# Patient Record
Sex: FEMALE | Race: AMERICAN INDIAN OR ALASKA NATIVE | URBAN - METROPOLITAN AREA
[De-identification: names, ages, dates, MRNs, and addresses within clinical notes are randomized per-mention and may not be internally consistent; named-entity substitution may affect disease eponyms.]

---

## 2017-07-12 RX ORDER — CEPHALEXIN 500 MG
1 CAPSULE ORAL
Qty: 0 | Refills: 0 | COMMUNITY
Start: 2017-07-12

## 2017-07-13 ENCOUNTER — INPATIENT (INPATIENT)
Facility: HOSPITAL | Age: 27
LOS: 1 days | Discharge: HOME | DRG: 920 | End: 2017-07-15
Attending: HOSPITALIST | Admitting: HOSPITALIST
Payer: MEDICAID

## 2017-07-13 VITALS
SYSTOLIC BLOOD PRESSURE: 116 MMHG | RESPIRATION RATE: 12 BRPM | WEIGHT: 179.9 LBS | HEIGHT: 67 IN | HEART RATE: 84 BPM | DIASTOLIC BLOOD PRESSURE: 78 MMHG | OXYGEN SATURATION: 100 % | TEMPERATURE: 98 F
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
## 2017-07-13 DIAGNOSIS — L76.34 POSTPROCEDURAL SEROMA OF SKIN AND SUBCUTANEOUS TISSUE FOLLOWING OTHER PROCEDURE: ICD-10-CM

## 2017-07-13 LAB
ALBUMIN SERPL ELPH-MCNC: 3.6 G/DL — SIGNIFICANT CHANGE UP (ref 3.3–5)
ALP SERPL-CCNC: 86 U/L — SIGNIFICANT CHANGE UP (ref 40–120)
ALT FLD-CCNC: 30 U/L — SIGNIFICANT CHANGE UP (ref 12–78)
AMYLASE P1 CFR SERPL: 48 U/L — SIGNIFICANT CHANGE UP (ref 25–115)
ANION GAP SERPL CALC-SCNC: 5 MMOL/L — SIGNIFICANT CHANGE UP (ref 5–17)
APPEARANCE UR: CLEAR — SIGNIFICANT CHANGE UP
AST SERPL-CCNC: 15 U/L — SIGNIFICANT CHANGE UP (ref 15–37)
BASOPHILS # BLD AUTO: 0 K/UL — SIGNIFICANT CHANGE UP (ref 0–0.2)
BASOPHILS NFR BLD AUTO: 0.7 % — SIGNIFICANT CHANGE UP (ref 0–2)
BILIRUB SERPL-MCNC: 0.5 MG/DL — SIGNIFICANT CHANGE UP (ref 0.2–1.2)
BILIRUB UR-MCNC: NEGATIVE — SIGNIFICANT CHANGE UP
BUN SERPL-MCNC: 10 MG/DL — SIGNIFICANT CHANGE UP (ref 7–23)
CALCIUM SERPL-MCNC: 8.8 MG/DL — SIGNIFICANT CHANGE UP (ref 8.5–10.1)
CHLORIDE SERPL-SCNC: 107 MMOL/L — SIGNIFICANT CHANGE UP (ref 96–108)
CO2 SERPL-SCNC: 28 MMOL/L — SIGNIFICANT CHANGE UP (ref 22–31)
COLOR SPEC: YELLOW — SIGNIFICANT CHANGE UP
CREAT SERPL-MCNC: 0.68 MG/DL — SIGNIFICANT CHANGE UP (ref 0.5–1.3)
CRP SERPL-MCNC: 0.3 MG/DL — SIGNIFICANT CHANGE UP (ref 0–0.4)
DIFF PNL FLD: NEGATIVE — SIGNIFICANT CHANGE UP
EOSINOPHIL # BLD AUTO: 0.2 K/UL — SIGNIFICANT CHANGE UP (ref 0–0.5)
EOSINOPHIL NFR BLD AUTO: 3.2 % — SIGNIFICANT CHANGE UP (ref 0–6)
ERYTHROCYTE [SEDIMENTATION RATE] IN BLOOD: 17 MM/HR — HIGH (ref 0–15)
GLUCOSE SERPL-MCNC: 83 MG/DL — SIGNIFICANT CHANGE UP (ref 70–99)
GLUCOSE UR QL: NEGATIVE — SIGNIFICANT CHANGE UP
HCG SERPL-ACNC: <1 MIU/ML — SIGNIFICANT CHANGE UP
HCT VFR BLD CALC: 36.3 % — SIGNIFICANT CHANGE UP (ref 34.5–45)
HGB BLD-MCNC: 12.3 G/DL — SIGNIFICANT CHANGE UP (ref 11.5–15.5)
KETONES UR-MCNC: NEGATIVE — SIGNIFICANT CHANGE UP
LACTATE SERPL-SCNC: 0.8 MMOL/L — SIGNIFICANT CHANGE UP (ref 0.7–2)
LEUKOCYTE ESTERASE UR-ACNC: ABNORMAL
LIDOCAIN IGE QN: 141 U/L — SIGNIFICANT CHANGE UP (ref 73–393)
LYMPHOCYTES # BLD AUTO: 2.5 K/UL — SIGNIFICANT CHANGE UP (ref 1–3.3)
LYMPHOCYTES # BLD AUTO: 36.3 % — SIGNIFICANT CHANGE UP (ref 13–44)
MCHC RBC-ENTMCNC: 29.2 PG — SIGNIFICANT CHANGE UP (ref 27–34)
MCHC RBC-ENTMCNC: 33.7 GM/DL — SIGNIFICANT CHANGE UP (ref 32–36)
MCV RBC AUTO: 86.4 FL — SIGNIFICANT CHANGE UP (ref 80–100)
MONOCYTES # BLD AUTO: 0.4 K/UL — SIGNIFICANT CHANGE UP (ref 0–0.9)
MONOCYTES NFR BLD AUTO: 6.4 % — SIGNIFICANT CHANGE UP (ref 1–9)
NEUTROPHILS # BLD AUTO: 3.6 K/UL — SIGNIFICANT CHANGE UP (ref 1.8–7.4)
NEUTROPHILS NFR BLD AUTO: 53.4 % — SIGNIFICANT CHANGE UP (ref 43–77)
NITRITE UR-MCNC: NEGATIVE — SIGNIFICANT CHANGE UP
PH UR: 5 — SIGNIFICANT CHANGE UP (ref 5–8)
PLATELET # BLD AUTO: 280 K/UL — SIGNIFICANT CHANGE UP (ref 150–400)
POTASSIUM SERPL-MCNC: 3.5 MMOL/L — SIGNIFICANT CHANGE UP (ref 3.5–5.3)
POTASSIUM SERPL-SCNC: 3.5 MMOL/L — SIGNIFICANT CHANGE UP (ref 3.5–5.3)
PROCALCITONIN SERPL-MCNC: <0.05 NG/ML — HIGH (ref 0–0.04)
PROT SERPL-MCNC: 7.1 G/DL — SIGNIFICANT CHANGE UP (ref 6–8.3)
PROT UR-MCNC: NEGATIVE — SIGNIFICANT CHANGE UP
RBC # BLD: 4.2 M/UL — SIGNIFICANT CHANGE UP (ref 3.8–5.2)
RBC # FLD: 13.8 % — SIGNIFICANT CHANGE UP (ref 10.3–14.5)
SODIUM SERPL-SCNC: 140 MMOL/L — SIGNIFICANT CHANGE UP (ref 135–145)
SP GR SPEC: 1 — LOW (ref 1.01–1.02)
UROBILINOGEN FLD QL: NEGATIVE — SIGNIFICANT CHANGE UP
WBC # BLD: 6.8 K/UL — SIGNIFICANT CHANGE UP (ref 3.8–10.5)
WBC # FLD AUTO: 6.8 K/UL — SIGNIFICANT CHANGE UP (ref 3.8–10.5)

## 2017-07-13 PROCEDURE — 99223 1ST HOSP IP/OBS HIGH 75: CPT | Mod: AI,GC

## 2017-07-13 PROCEDURE — 99285 EMERGENCY DEPT VISIT HI MDM: CPT

## 2017-07-13 PROCEDURE — 74177 CT ABD & PELVIS W/CONTRAST: CPT | Mod: 26

## 2017-07-13 PROCEDURE — 71010: CPT | Mod: 26

## 2017-07-13 RX ORDER — PIPERACILLIN AND TAZOBACTAM 4; .5 G/20ML; G/20ML
3.38 INJECTION, POWDER, LYOPHILIZED, FOR SOLUTION INTRAVENOUS ONCE
Qty: 0 | Refills: 0 | Status: COMPLETED | OUTPATIENT
Start: 2017-07-13 | End: 2017-07-13

## 2017-07-13 RX ORDER — IOHEXOL 300 MG/ML
30 INJECTION, SOLUTION INTRAVENOUS ONCE
Qty: 0 | Refills: 0 | Status: COMPLETED | OUTPATIENT
Start: 2017-07-13 | End: 2017-07-13

## 2017-07-13 RX ORDER — SODIUM CHLORIDE 9 MG/ML
1000 INJECTION INTRAMUSCULAR; INTRAVENOUS; SUBCUTANEOUS ONCE
Qty: 0 | Refills: 0 | Status: COMPLETED | OUTPATIENT
Start: 2017-07-13 | End: 2017-07-13

## 2017-07-13 RX ORDER — SODIUM CHLORIDE 9 MG/ML
3 INJECTION INTRAMUSCULAR; INTRAVENOUS; SUBCUTANEOUS ONCE
Qty: 0 | Refills: 0 | Status: COMPLETED | OUTPATIENT
Start: 2017-07-13 | End: 2017-07-13

## 2017-07-13 RX ADMIN — SODIUM CHLORIDE 3 MILLILITER(S): 9 INJECTION INTRAMUSCULAR; INTRAVENOUS; SUBCUTANEOUS at 17:59

## 2017-07-13 RX ADMIN — SODIUM CHLORIDE 1000 MILLILITER(S): 9 INJECTION INTRAMUSCULAR; INTRAVENOUS; SUBCUTANEOUS at 18:04

## 2017-07-13 RX ADMIN — IOHEXOL 30 MILLILITER(S): 300 INJECTION, SOLUTION INTRAVENOUS at 18:05

## 2017-07-13 RX ADMIN — PIPERACILLIN AND TAZOBACTAM 200 GRAM(S): 4; .5 INJECTION, POWDER, LYOPHILIZED, FOR SOLUTION INTRAVENOUS at 21:22

## 2017-07-13 NOTE — H&P ADULT - PSH
H/O tubal ligation    History of abdominoplasty    S/P plastic surgery  Chin, bilateral under arms, abdomen

## 2017-07-13 NOTE — H&P ADULT - PROBLEM SELECTOR PLAN 3
-1 stat dose of Heparin 5000 units SQ ordered  -no vte ppx, awaiting surgery evaluation  -Dulcolax for GI ppx

## 2017-07-13 NOTE — H&P ADULT - NSHPREVIEWOFSYSTEMS_GEN_ALL_CORE
Constitutional: admitted to fever, chills, denied diaphoresis   HEENT: admitted to bruising and hardness of skin around chin due to liposuction on chin. Denies blurry vision, difficulty hearing  Respiratory: denies SOB, [place], cough, sputum production, wheezing, hemoptysis  Cardiovascular: denies CP, palpitations, edema  Gastrointestinal: admits to nausea, denies vomiting, diarrhea, constipation, abdominal pain, melena, hematochezia   Genitourinary: denies dysuria, frequency, urgency, hematuria   Skin/Breast: pain, swelling, bleeding and yellow pus oozing from abdominal surgical site. Denies pain or swelling of other surgery sites (chin, b/l under arms, upper and lower back).   Musculoskeletal: denies myalgias, joint swelling, muscle weakness  Neurologic: admits to migraines, denies dizziness, paresthesias, numbness/tingling  Hematology/Oncology: denies bruising, tender or enlarged lymph nodes

## 2017-07-13 NOTE — H&P ADULT - HISTORY OF PRESENT ILLNESS
Pt is a 28 yo female with no significant PMHx  who presents to the ED with a cc of possible surgical infection.  Pt reports that on 6/22/17 she was underwent an abdominoplasty in the DR.  She also reports liposuction of her back, lower chin, and bilateral arms.  Pt traveled home late Monday night, early Tuesday morning. She was able to go to work Tuesday night but she felt discomfort/pain at the surgical site.  Pt previously had no complications at the surgical site.  She then reports that yesterday she felt lightheaded and a rush of heat around the surgical site.   She was at her job and had a physician look at the site.  They felt that it may be infected and started po Keflex.  Last night she reported fever of 102, and today her T max was 100.  She had s physician at her office look at it again and they recommended she come to the ED because it appeared infected.  Pt reports that yesterday she had a HA with associated nausea.  She also reports that she has developed abdominal bloating and chills.  Today when she went to clean the site she noted that some of the skin has begun to separate and that she had some bleeding at the surgical site which she never had before.  Denies N/V/D/C, CP, SOB, ext numbness or weakness.  After the surgery while she was in the ED she completed a 7 day course of Cipro.  Denies vaginal bleeding Pt is a 26 yo female with no significant PMHx who presented with pain, swelling and bleeding of abdominal surgical site s/p abdominoplasty.  Pt reports that on 6/22/17 she was underwent an abdominoplasty in the DR.  She also reports liposuction of her back, lower chin, and bilateral arms.  Pt traveled home late Monday night, early Tuesday morning. She was able to go to work Tuesday night but she felt discomfort/pain at the surgical site.  Pt previously had no complications at the surgical site.  She then reports that yesterday she felt lightheaded and a rush of heat around the surgical site.   She was at her job and had a physician look at the site.  They felt that it may be infected and started po Keflex.  Last night she reported fever of 102, and today her T max was 100.  She had s physician at her office look at it again and they recommended she come to the ED because it appeared infected.  Pt reports that yesterday she had a HA with associated nausea.  She also reports that she has developed abdominal bloating and chills.  Today when she went to clean the site she noted that some of the skin had begun to separate and that she had some bleeding at the surgical site which she never had before.  Patient was seen and examined lying comfortably in bed in NAD with  at bedside. Patient admitted to nausea, migraines, chills but denied V/D/C, CP, SOB, vaginal bleeding, ext numbness or weakness.  After the surgery while she was in the ED she completed a 7 day course of Cipro. She also started Cephalexin 500mg PO yesterday by the physician that told her to come to the ED.     In the ED, the patient's vital signs were stable, T 98.2, HR 84, /78, R 12, SpO2 100% on RA. ESR slightly elevated at 17. Lipase WNL at 141. CBC with dif WNL. CRP WNL 0.30. CMP WNL. Procalcitonin slightly elevated at 0.05. UA showed trace LEs. CT abd & pelvis demonstrated dffuse subcutaneous truncal edema/induration may reflect cellulitis. Correlate clinically for fasciitis.[place] subcutaneous fluid collection mid to lower anterior abdominal wall ay represent seroma or abscess. No intraperitoneal collections. CXR was normal. Patient completed IV Zosyn x1, 1L NS bolus x1 in ED. Blood culture, surgical swab culture, urine cx pending. Plastic surgery was consulted in ED and attempted to drain the patient's abdomen but no fluid was aspirated. Pt is a 28 yo female with no significant PMHx who presented with pain, swelling and bleeding of abdominal surgical site s/p abdominoplasty.  Pt reports that on 6/22/17 she was underwent an abdominoplasty in the DR.  She also reports liposuction of her back, lower chin, and bilateral arms.  Pt traveled home late Monday night, early Tuesday morning. She was able to go to work Tuesday night but she felt discomfort/pain at the surgical site.  Pt previously had no complications at the surgical site.  She then reports that yesterday she felt lightheaded and a rush of heat around the surgical site.   She was at her job and had a physician look at the site.  They felt that it may be infected and started po Keflex.  Last night she reported fever of 102, and today her T max was 100.  She had s physician at her office look at it again and they recommended she come to the ED because it appeared infected.  Pt reports that yesterday she had a HA with associated nausea.  She also reports that she has developed abdominal bloating and chills.  Today when she went to clean the site she noted that some of the skin had begun to separate and that she had some bleeding at the surgical site which she never had before.  Patient was seen and examined lying comfortably in bed in NAD with  at bedside. Patient admitted to nausea, migraines, chills but denied V/D/C, CP, SOB, vaginal bleeding, ext numbness or weakness.  After the surgery while she was in the ED she completed a 7 day course of Cipro. She also started Cephalexin 500mg PO yesterday by the physician that told her to come to the ED.     In the ED, the patient's vital signs were stable, T 98.2, HR 84, /78, R 12, SpO2 100% on RA. ESR slightly elevated at 17. Lipase WNL at 141. CBC with dif WNL. CRP WNL 0.30. CMP WNL. Procalcitonin slightly elevated at 0.05. UA showed trace LEs. CT abd & pelvis demonstrated dffuse subcutaneous truncal edema/induration may reflect cellulitis. Correlate clinically for fasciitis.[place] subcutaneous fluid collection mid to lower anterior abdominal wall ay represent seroma or abscess. No intraperitoneal collections. CXR was normal. Patient completed IV Zosyn x1, 1L NS bolus x1 in ED. Blood culture, surgical swab culture, urine cx pending. Plastic surgery was consulted in ED and attempted to drain the patient's abdomen but no fluid was aspirated.   Plastic surgery Dr. Smith was unclear about the possible abscess, wanted general surgery  to evaluate it.

## 2017-07-13 NOTE — ED ADULT TRIAGE NOTE - CHIEF COMPLAINT QUOTE
patient with complain of incision in infection from "tummy tuck" done 6/22/2017 sent to ED by PMD for evaluation.

## 2017-07-13 NOTE — H&P ADULT - FAMILY HISTORY
Father  Still living? Unknown  Family history of diabetes mellitus, Age at diagnosis: Age Unknown  Family history of hypertension, Age at diagnosis: Age Unknown  Family history of hyperlipidemia, Age at diagnosis: Age Unknown     Mother  Still living? Unknown  Family history of diabetes mellitus, Age at diagnosis: Age Unknown  Family history of hypertension, Age at diagnosis: Age Unknown  Family history of hyperlipidemia, Age at diagnosis: Age Unknown

## 2017-07-13 NOTE — ED PROVIDER NOTE - CARE PLAN
Principal Discharge DX:	Abdominal pain  Instructions for follow-up, activity and diet:	admit  Secondary Diagnosis:	Fluid collection at surgical site, initial encounter

## 2017-07-13 NOTE — H&P ADULT - NSHPPHYSICALEXAM_GEN_ALL_CORE
Vital Signs Last 24 Hrs  T(C): 36.7 (13 Jul 2017 19:15), Max: 36.8 (13 Jul 2017 16:52)  T(F): 98 (13 Jul 2017 19:15), Max: 98.2 (13 Jul 2017 16:52)  HR: 72 (13 Jul 2017 21:45) (72 - 84)  BP: 112/80 (13 Jul 2017 21:45) (112/80 - 118/84)  BP(mean): --  RR: 14 (13 Jul 2017 21:45) (12 - 14)  SpO2: 100% (13 Jul 2017 21:45) (100% - 100%)    Physical Exam:  General: 27 year old female appears stated age lying in stretcher in NAD, well developed, well nourished  HEENT: NCAT, PERRLA, EOMI bl, moist mucous membranes, +chin is tender on deep palpation, and rough to the touch  Neck: Supple, nontender, no mass  Neurology: A&Ox3, nonfocal deficits, sensation intact  Respiratory: CTA B/L, No W/R/R  CV: RRR, +S1/S2, no murmurs, rubs or gallops  Abdominal: Soft, tender to palpation at the center of abdominal surgical site which extends from right to left ASIS, surgical site is currently dry without bleeding or oozing, pinkish in color, mildly distended abdomen, +BSx4  Extremities: No C/C/E, + peripheral pulses  MSK: Normal ROM, no joint erythema or warmth, no joint swelling   Skin: 2 small (~1cm x1cm each) healing circular wounds on upper and lower back, healed under arm scars, no open surgical site on chin, normal skin turgor, normal capillary refill

## 2017-07-13 NOTE — H&P ADULT - ASSESSMENT
Pt is a 28 yo female with no significant PMHx who presented with pain, swelling and bleeding of abdominal surgical site s/p abdominoplasty, admitted for evaluation of surgical site infection.

## 2017-07-13 NOTE — H&P ADULT - ATTENDING COMMENTS
Pt is a 28 yo female with no significant PMHx who presented with pain, swelling and bleeding of abdominal surgical site s/p abdominoplasty, admitted for evaluation of surgical site infection.  Unclear if cellulitis/abscess/fluid collection.  Plastics and Gen surg to evaluate.

## 2017-07-13 NOTE — ED PROVIDER NOTE - OBJECTIVE STATEMENT
Pt is a 28 yo female who presents to the ED with a cc of possible surgical infection.  Pt reports that on 6/22/17 she was underwent an abdominoplasty in the DR.  She also reports liposuction of her back, lower chin, and bilateral arms.  Pt traveled home late Monday night, early Tuesday morning. She was able to go to work Tuesday night but she felt discomfort/pain at the surgical site.  Pt previously had no complications at the surgical site.  She then reports that yesterday she felt lightheaded and a rush of heat around the surgical site.   She was at her job and had a physician look at the site.  They felt that it may be infected and started po Keflex.  Last night she reported fever of 102, and today her T max was 100.  She had s physician at her office look at it again and they recommended she come to the ED because it appeared infected.  Pt reports that yesterday she had a HA with associated nausea.  She also reports that she has developed abdominal bloating and chills.  Today when she went to clean the site she noted that some of the skin has begun to separate and that she had some bleeding at the surgical site which she never had before.  Denies N/V/D/C, CP, SOB, ext numbness or weakness.  After the surgery while she was in the ED she completed a 7 day course of Cipro.  Denies vaginal bleeding

## 2017-07-13 NOTE — ED PROVIDER NOTE - SKIN, MLM
Surgical site noted to lower abdominal region with some distension, surrounding redness and increased warmth.  Parts of the superficial skin is beginning to separate with serosanguinous discharge and intermittent bloody discharge from site

## 2017-07-13 NOTE — H&P ADULT - PROBLEM SELECTOR PLAN 1
-Abdominal pain, swelling 2/2 possible surgical site infection s/p abdominoplasty. CT abd&pelvis showed diffuse subcutaneous truncal edema/induration may reflect cellulitis. [place] subcutaneous fluid collection mid to lower anterior abdominal wall   may represent seroma or abscess. No intraperitoneal collections.  -admit to F  -NPO for now  -Continue IV Zosyn q8hrs over 4 hours   -IVF NS @ 75 cc/hr, stop after 6 hours   -Zofran prn for nausea   -Surgery consult, f/u recs Dr. Catalan   -Plastic surgery consult, f/u recs Dr. Mehta  -f/u wound cx, blood cx, urine cx   -fall precautions  -out of bed with assistance

## 2017-07-13 NOTE — H&P ADULT - NSHPSOCIALHISTORY_GEN_ALL_CORE
Social History:    Marital Status:    Occupation: works at doctor's office  Lives with:  and 3 kids  Ambulates at home: independently without assistive devices     Substance Use: denies  Tobacco Usage: denies  Alcohol Usage: denies   Illicit Drug Usage: denies

## 2017-07-14 ENCOUNTER — TRANSCRIPTION ENCOUNTER (OUTPATIENT)
Age: 27
End: 2017-07-14

## 2017-07-14 DIAGNOSIS — R10.9 UNSPECIFIED ABDOMINAL PAIN: ICD-10-CM

## 2017-07-14 DIAGNOSIS — Z98.51 TUBAL LIGATION STATUS: Chronic | ICD-10-CM

## 2017-07-14 DIAGNOSIS — Z98.890 OTHER SPECIFIED POSTPROCEDURAL STATES: Chronic | ICD-10-CM

## 2017-07-14 DIAGNOSIS — R10.84 GENERALIZED ABDOMINAL PAIN: ICD-10-CM

## 2017-07-14 DIAGNOSIS — Z98.890 OTHER SPECIFIED POSTPROCEDURAL STATES: ICD-10-CM

## 2017-07-14 DIAGNOSIS — Z29.9 ENCOUNTER FOR PROPHYLACTIC MEASURES, UNSPECIFIED: ICD-10-CM

## 2017-07-14 LAB
ANION GAP SERPL CALC-SCNC: 5 MMOL/L — SIGNIFICANT CHANGE UP (ref 5–17)
BUN SERPL-MCNC: 6 MG/DL — LOW (ref 7–23)
CALCIUM SERPL-MCNC: 8.3 MG/DL — LOW (ref 8.5–10.1)
CHLORIDE SERPL-SCNC: 110 MMOL/L — HIGH (ref 96–108)
CO2 SERPL-SCNC: 27 MMOL/L — SIGNIFICANT CHANGE UP (ref 22–31)
CREAT SERPL-MCNC: 0.72 MG/DL — SIGNIFICANT CHANGE UP (ref 0.5–1.3)
CULTURE RESULTS: NO GROWTH — SIGNIFICANT CHANGE UP
GLUCOSE SERPL-MCNC: 87 MG/DL — SIGNIFICANT CHANGE UP (ref 70–99)
HCT VFR BLD CALC: 34.7 % — SIGNIFICANT CHANGE UP (ref 34.5–45)
HGB BLD-MCNC: 11.5 G/DL — SIGNIFICANT CHANGE UP (ref 11.5–15.5)
MCHC RBC-ENTMCNC: 28.4 PG — SIGNIFICANT CHANGE UP (ref 27–34)
MCHC RBC-ENTMCNC: 33.2 GM/DL — SIGNIFICANT CHANGE UP (ref 32–36)
MCV RBC AUTO: 85.6 FL — SIGNIFICANT CHANGE UP (ref 80–100)
PLATELET # BLD AUTO: 242 K/UL — SIGNIFICANT CHANGE UP (ref 150–400)
POTASSIUM SERPL-MCNC: 4.1 MMOL/L — SIGNIFICANT CHANGE UP (ref 3.5–5.3)
POTASSIUM SERPL-SCNC: 4.1 MMOL/L — SIGNIFICANT CHANGE UP (ref 3.5–5.3)
RBC # BLD: 4.05 M/UL — SIGNIFICANT CHANGE UP (ref 3.8–5.2)
RBC # FLD: 13.6 % — SIGNIFICANT CHANGE UP (ref 10.3–14.5)
SODIUM SERPL-SCNC: 142 MMOL/L — SIGNIFICANT CHANGE UP (ref 135–145)
SPECIMEN SOURCE: SIGNIFICANT CHANGE UP
WBC # BLD: 4.8 K/UL — SIGNIFICANT CHANGE UP (ref 3.8–10.5)
WBC # FLD AUTO: 4.8 K/UL — SIGNIFICANT CHANGE UP (ref 3.8–10.5)

## 2017-07-14 PROCEDURE — 49406 IMAGE CATH FLUID PERI/RETRO: CPT

## 2017-07-14 PROCEDURE — 99233 SBSQ HOSP IP/OBS HIGH 50: CPT | Mod: GC

## 2017-07-14 RX ORDER — SODIUM CHLORIDE 9 MG/ML
1000 INJECTION INTRAMUSCULAR; INTRAVENOUS; SUBCUTANEOUS
Qty: 0 | Refills: 0 | Status: DISCONTINUED | OUTPATIENT
Start: 2017-07-14 | End: 2017-07-15

## 2017-07-14 RX ORDER — PIPERACILLIN AND TAZOBACTAM 4; .5 G/20ML; G/20ML
3.38 INJECTION, POWDER, LYOPHILIZED, FOR SOLUTION INTRAVENOUS EVERY 8 HOURS
Qty: 0 | Refills: 0 | Status: DISCONTINUED | OUTPATIENT
Start: 2017-07-14 | End: 2017-07-15

## 2017-07-14 RX ORDER — HEPARIN SODIUM 5000 [USP'U]/ML
5000 INJECTION INTRAVENOUS; SUBCUTANEOUS ONCE
Qty: 0 | Refills: 0 | Status: COMPLETED | OUTPATIENT
Start: 2017-07-14 | End: 2017-07-14

## 2017-07-14 RX ORDER — ENOXAPARIN SODIUM 100 MG/ML
40 INJECTION SUBCUTANEOUS EVERY 24 HOURS
Qty: 0 | Refills: 0 | Status: DISCONTINUED | OUTPATIENT
Start: 2017-07-14 | End: 2017-07-14

## 2017-07-14 RX ORDER — ONDANSETRON 8 MG/1
4 TABLET, FILM COATED ORAL EVERY 6 HOURS
Qty: 0 | Refills: 0 | Status: DISCONTINUED | OUTPATIENT
Start: 2017-07-14 | End: 2017-07-15

## 2017-07-14 RX ADMIN — SODIUM CHLORIDE 75 MILLILITER(S): 9 INJECTION INTRAMUSCULAR; INTRAVENOUS; SUBCUTANEOUS at 05:31

## 2017-07-14 RX ADMIN — SODIUM CHLORIDE 75 MILLILITER(S): 9 INJECTION INTRAMUSCULAR; INTRAVENOUS; SUBCUTANEOUS at 02:55

## 2017-07-14 RX ADMIN — PIPERACILLIN AND TAZOBACTAM 25 GRAM(S): 4; .5 INJECTION, POWDER, LYOPHILIZED, FOR SOLUTION INTRAVENOUS at 21:21

## 2017-07-14 RX ADMIN — PIPERACILLIN AND TAZOBACTAM 25 GRAM(S): 4; .5 INJECTION, POWDER, LYOPHILIZED, FOR SOLUTION INTRAVENOUS at 05:27

## 2017-07-14 RX ADMIN — HEPARIN SODIUM 5000 UNIT(S): 5000 INJECTION INTRAVENOUS; SUBCUTANEOUS at 05:27

## 2017-07-14 RX ADMIN — PIPERACILLIN AND TAZOBACTAM 25 GRAM(S): 4; .5 INJECTION, POWDER, LYOPHILIZED, FOR SOLUTION INTRAVENOUS at 13:21

## 2017-07-14 NOTE — DISCHARGE NOTE ADULT - CARE PLAN
Principal Discharge DX:	Fluid collection at surgical site, initial encounter  Goal:	Drainage of site and wound healing  Instructions for follow-up, activity and diet:	Keep drain in place, keep surgical site clean and dry. Follow-up with Dr. Asher.  Secondary Diagnosis:	History of abdominoplasty  Instructions for follow-up, activity and diet:	Keep surgical sites clean and dry. Follow-up with Dr. Asher Principal Discharge DX:	Fluid collection at surgical site, initial encounter  Goal:	Drainage of site and wound healing  Instructions for follow-up, activity and diet:	Keep drain in place, keep surgical site clean and dry. Follow-up with Dr. Mehta.  Secondary Diagnosis:	History of abdominoplasty  Instructions for follow-up, activity and diet:	Keep surgical sites clean and dry. Follow-up with Dr. Mehta

## 2017-07-14 NOTE — DISCHARGE NOTE ADULT - PATIENT PORTAL LINK FT
“You can access the [facility] Patient Portal, offered by [facility], by registering with the following website: [email or web address]”

## 2017-07-14 NOTE — PROGRESS NOTE ADULT - SUBJECTIVE AND OBJECTIVE BOX
Resident Progress Note    Patient is a 27y old  Female who presents with a chief complaint of c/o possible surgical site infection (2017 23:31)      HPI: Patient seen and examined at bedside. No acute events overnight. Reports that she is frustrated with her care  since she can't eat or drink anything at this time. Denies any SOB, abdominal pain, N/V, f/c, CP, dizziness, HA.      Vital Signs Last 24 Hrs  T(C): 36.7 (17 @ 13:14), Max: 36.8 (17 @ 16:52)  T(F): 98 (17 @ 13:14), Max: 98.2 (17 @ 16:52)  HR: 75 (17 @ 13:14) (68 - 84)  BP: 112/78 (17 @ 13:14) (106/60 - 118/84)  BP(mean): --  RR: 16 (17 @ 13:14) (12 - 17)  SpO2: 99% (17 @ 13:14) (99% - 100%)    Physical Exam:  General: Well developed, well nourished, NAD, young female  HEENT: NCAT, PERRLA, EOMI bl, moist mucous membranes   Neck: Supple, nontender, no mass  Neurology: A&Ox3, nonfocal, CN II-XII grossly intact  Respiratory: CTA B/L, No W/R/R  CV: RRR, +S1/S2, no murmurs, rubs or gallops  Abdominal: Soft,ND +BSx4, + mid to lower anterior abdominal wall surgical site is TTP, pink, no discharge or bleeding noted.  Extremities: No C/C/E, + peripheral pulses   Skin: warm, dry, normal color, no rash or abnormal lesions    LABS:                        11.5   4.8   )-----------( 242      ( 2017 08:56 )             34.7     2017 08:56    142    |  110    |  6      ----------------------------<  87     4.1     |  27     |  0.72     Ca    8.3        2017 08:56    TPro  7.1    /  Alb  3.6    /  TBili  0.5    /  DBili  x      /  AST  15     /  ALT  30     /  AlkPhos  86     2017 17:55          Urinalysis Basic - ( 2017 21:21 )    Color: Yellow / Appearance: Clear / S.005 / pH: x  Gluc: x / Ketone: Negative  / Bili: Negative / Urobili: Negative   Blood: x / Protein: Negative / Nitrite: Negative   Leuk Esterase: Trace / RBC: 0-2 /HPF / WBC 3-5   Sq Epi: x / Non Sq Epi: Moderate / Bacteria: Few      CAPILLARY BLOOD GLUCOSE            RADIOLOGY & ADDITIONAL TESTS:    MEDICATIONS  (STANDING):  sodium chloride 0.9%. 1000 milliLiter(s) (75 mL/Hr) IV Continuous <Continuous>  piperacillin/tazobactam IVPB. 3.375 Gram(s) IV Intermittent every 8 hours    MEDICATIONS  (PRN):  ondansetron Injectable 4 milliGRAM(s) IV Push every 6 hours PRN Nausea  bisacodyl 5 milliGRAM(s) Oral daily PRN Constipation      Allergies    No Known Allergies    Intolerances

## 2017-07-14 NOTE — DISCHARGE NOTE ADULT - MEDICATION SUMMARY - MEDICATIONS TO TAKE
I will START or STAY ON the medications listed below when I get home from the hospital:    Augmentin 875 mg-125 mg oral tablet  -- 1 tab(s) by mouth every 12 hours  -- Indication: For Fluid collection at surgical site, initial encounter

## 2017-07-14 NOTE — PATIENT PROFILE ADULT. - VISION (WITH CORRECTIVE LENSES IF THE PATIENT USUALLY WEARS THEM):
Severely impaired: cannot locate objects without hearing or touching them or patient nonresponsive./glasses

## 2017-07-14 NOTE — DISCHARGE NOTE ADULT - MEDICATION SUMMARY - MEDICATIONS TO STOP TAKING
I will STOP taking the medications listed below when I get home from the hospital:    cephalexin 500 mg oral tablet  -- 1 tab(s) by mouth 4 times a day

## 2017-07-14 NOTE — PROGRESS NOTE ADULT - PROBLEM SELECTOR PLAN 2
-Abdominal pain, swelling 2/2 possible surgical site infection s/p abdominoplasty. CT abd&pelvis showed diffuse subcutaneous truncal edema/induration may reflect cellulitis. [place] subcutaneous fluid collection mid to lower anterior abdominal wall   may represent seroma or abscess. No intraperitoneal collections.  -admit to F  -NPO for now  -Continue IV Zosyn q8hrs over 4 hours   -IVF NS @ 75 cc/hr, stop after 6 hours   -Zofran prn for nausea   -Surgery consult, f/u recs Dr. Catalan   -Plastic surgery consult, f/u recs Dr. Mehta  -f/u wound cx, blood cx, urine cx   -fall precautions  -out of bed with assistance -Abdominal pain, swelling 2/2 possible surgical site infection s/p abdominoplasty. CT abd&pelvis showed diffuse subcutaneous truncal edema/induration may reflect cellulitis. [place] subcutaneous fluid collection mid to lower anterior abdominal wall   may represent seroma or abscess. No intraperitoneal collections.  -Dr. Malhotra following  -Patient is low risk patient for a low to intermediate procedure.  -Dr. Ha is taking patient for an abdominal abscess drainage today    -NPO for now  -Continue IV Zosyn q8hrs over 4 hours   -Zofran prn for nausea   -Surgery consult, f/u recs Dr. Catalan   -Plastic surgery consult, f/u recs Dr. Mehta  -f/u wound cx, blood cx, urine cx   -fall precautions  -out of bed with assistance

## 2017-07-14 NOTE — PROGRESS NOTE ADULT - PROBLEM SELECTOR PLAN 1
-Plastic Surgery (Dr. Smith) following  -Dr. Catalan (Surgery) was notified and did not know he was consulted on patient even though admission note stated that Dr. Catalan was consulted. Have tried to contact Dr Smith however her office said she is on her way to [place].  -Continue Zosyn  -NPO for now  -Zofran prn for nausea   -f/u wound cx, blood cx, urine cx   -fall precautions  -out of bed with assistance

## 2017-07-14 NOTE — CHART NOTE - NSCHARTNOTEFT_GEN_A_CORE
Patient s/p IR drainage of abdominal abscess, 100cc drained.  Discussed with Dr. Asher, may advance patient's diet and if stable may discharge home with drain in place and follow-up outpatient.

## 2017-07-14 NOTE — DISCHARGE NOTE ADULT - HOSPITAL COURSE
27F with no significant PMHx who presented with pain, swelling and bleeding of abdominal surgical site s/p abdominoplasty.  Pt reported that on 6/22/17 she was underwent an abdominoplasty in the DR.  She also reported liposuction of her back, lower chin, and bilateral arms.  Pt traveled home late 7/10, early the next morning. She was able to go to work the night of 7/11 but she felt discomfort/pain at the surgical site.  Pt previously had no complications at the surgical site.  She then reported that day prior to admission she felt lightheaded and a rush of heat around the surgical site.   She was at her job and had a physician look at the site.  They felt that it may be infected and started po Keflex.  Night before admission she reported fever of 102, and day of admission her T max was 100.  She had s physician at her office look at it again and they recommended she come to the ED because it appeared infected.  Pt reported day prior to admission had a HA with associated nausea.  She also reported abdominal bloating and chills.  Day of admission she went to clean the site and noted that some of the skin had begun to separate and that she had some bleeding at the surgical site which she never had before.  Patient was seen and examined lying comfortably in bed in NAD with  at bedside. Patient admitted to nausea, migraines, chills but denied V/D/C, CP, SOB, vaginal bleeding, ext numbness or weakness.  After the surgery while she was in the ED she completed a 7 day course of Cipro. She also started Cephalexin 500mg PO yesterday by the physician that told her to come to the ED.     In the ED, the patient's vital signs were stable, T 98.2, HR 84, /78, R 12, SpO2 100% on RA. ESR slightly elevated at 17. Lipase WNL at 141. CBC with dif WNL. CRP WNL 0.30. CMP WNL. Procalcitonin slightly elevated at 0.05. UA showed trace LEs. CT abd & pelvis demonstrated dffuse subcutaneous truncal edema/induration may reflect cellulitis. Correlate clinically for fasciitis.[place] subcutaneous fluid collection mid to lower anterior abdominal wall ay represent seroma or abscess. No intraperitoneal collections. CXR was normal. Patient completed IV Zosyn x1, 1L NS bolus x1 in ED. Blood culture, surgical swab culture, urine cx pending. Plastic surgery was consulted in ED and attempted to drain the patient's abdomen but no fluid was aspirated.   Plastic surgery Dr. Smith was unclear about the possible abscess, wanted general surgery  to evaluate it.     Patient admitted for possible abdominal abscess. Patient placed on IV antibiotics. Dr. Smith (plastic surgery) consulted interventional radiology (Dr Ha) who drained 100cc of fluid and placed a drain. Advanced patient's diet, hemodynamically stable, without leukocytosis, afebrile, stable for discharge home with outpatient follow-up.

## 2017-07-14 NOTE — BRIEF OPERATIVE NOTE - POST-OP DX
Seroma of skin or subcutaneous tissue after non-dermatologic procedure  07/14/2017    Active  Zack Ha

## 2017-07-14 NOTE — DISCHARGE NOTE ADULT - PLAN OF CARE
Drainage of site and wound healing Keep drain in place, keep surgical site clean and dry. Follow-up with Dr. Asher. Keep surgical sites clean and dry. Follow-up with Dr. Asher Keep drain in place, keep surgical site clean and dry. Follow-up with Dr. Mehta. Keep surgical sites clean and dry. Follow-up with Dr. Mehta

## 2017-07-15 VITALS
HEART RATE: 80 BPM | RESPIRATION RATE: 16 BRPM | SYSTOLIC BLOOD PRESSURE: 102 MMHG | DIASTOLIC BLOOD PRESSURE: 69 MMHG | OXYGEN SATURATION: 99 % | TEMPERATURE: 99 F

## 2017-07-15 LAB
ANION GAP SERPL CALC-SCNC: 8 MMOL/L — SIGNIFICANT CHANGE UP (ref 5–17)
BUN SERPL-MCNC: 8 MG/DL — SIGNIFICANT CHANGE UP (ref 7–23)
CALCIUM SERPL-MCNC: 8.7 MG/DL — SIGNIFICANT CHANGE UP (ref 8.5–10.1)
CHLORIDE SERPL-SCNC: 108 MMOL/L — SIGNIFICANT CHANGE UP (ref 96–108)
CO2 SERPL-SCNC: 26 MMOL/L — SIGNIFICANT CHANGE UP (ref 22–31)
CREAT SERPL-MCNC: 0.62 MG/DL — SIGNIFICANT CHANGE UP (ref 0.5–1.3)
GLUCOSE SERPL-MCNC: 82 MG/DL — SIGNIFICANT CHANGE UP (ref 70–99)
GRAM STN FLD: SIGNIFICANT CHANGE UP
HCT VFR BLD CALC: 35.9 % — SIGNIFICANT CHANGE UP (ref 34.5–45)
HGB BLD-MCNC: 12.1 G/DL — SIGNIFICANT CHANGE UP (ref 11.5–15.5)
MCHC RBC-ENTMCNC: 28.6 PG — SIGNIFICANT CHANGE UP (ref 27–34)
MCHC RBC-ENTMCNC: 33.6 GM/DL — SIGNIFICANT CHANGE UP (ref 32–36)
MCV RBC AUTO: 85.2 FL — SIGNIFICANT CHANGE UP (ref 80–100)
PLATELET # BLD AUTO: 223 K/UL — SIGNIFICANT CHANGE UP (ref 150–400)
POTASSIUM SERPL-MCNC: 3.8 MMOL/L — SIGNIFICANT CHANGE UP (ref 3.5–5.3)
POTASSIUM SERPL-SCNC: 3.8 MMOL/L — SIGNIFICANT CHANGE UP (ref 3.5–5.3)
RBC # BLD: 4.21 M/UL — SIGNIFICANT CHANGE UP (ref 3.8–5.2)
RBC # FLD: 13.6 % — SIGNIFICANT CHANGE UP (ref 10.3–14.5)
SODIUM SERPL-SCNC: 142 MMOL/L — SIGNIFICANT CHANGE UP (ref 135–145)
SPECIMEN SOURCE: SIGNIFICANT CHANGE UP
WBC # BLD: 4.8 K/UL — SIGNIFICANT CHANGE UP (ref 3.8–10.5)
WBC # FLD AUTO: 4.8 K/UL — SIGNIFICANT CHANGE UP (ref 3.8–10.5)

## 2017-07-15 PROCEDURE — 99238 HOSP IP/OBS DSCHRG MGMT 30/<: CPT

## 2017-07-15 RX ADMIN — PIPERACILLIN AND TAZOBACTAM 25 GRAM(S): 4; .5 INJECTION, POWDER, LYOPHILIZED, FOR SOLUTION INTRAVENOUS at 05:06

## 2017-07-15 NOTE — PROGRESS NOTE ADULT - SUBJECTIVE AND OBJECTIVE BOX
CHIEF COMPLAINT/INTERVAL HISTORY:  Pt. seen and evaluated for possible surgical site infection.  Pt. is feeling well today.  Reports no pain today.  Tolerating IV antibiotic.    REVIEW OF SYSTEMS:  No fever, CP, or SOB.    Vital Signs Last 24 Hrs  T(C): 36.7 (15 Jul 2017 05:11), Max: 36.9 (2017 20:12)  T(F): 98 (15 Jul 2017 05:11), Max: 98.5 (2017 20:12)  HR: 79 (15 Jul 2017 05:11) (69 - 79)  BP: 98/66 (15 Jul 2017 05:11) (94/68 - 112/78)  BP(mean): --  RR: 17 (15 Jul 2017 05:11) (16 - 17)  SpO2: 98% (15 Jul 2017 05:11) (98% - 99%)    PHYSICAL EXAM:  GENERAL: NAD  HEENT: EOMI, hearing normal, conjunctiva and sclera clear  Chest: CTA bilaterally, no wheezing  CV: S1S2, RRR,   GI: soft, +BS, NT/ND, +drain in RLQ, abdominoplasty incision healing.  Musculoskeletal: no edema  Psychiatric: affect nL, mood nL  Skin: warm and dry    LABS:                        12.1   4.8   )-----------( 223      ( 15 Jul 2017 07:23 )             35.9     07-15    142  |  108  |  8   ----------------------------<  82  3.8   |  26  |  0.62    Ca    8.7      15 Jul 2017 07:23    TPro  7.1  /  Alb  3.6  /  TBili  0.5  /  DBili  x   /  AST  15  /  ALT  30  /  AlkPhos  86  07-13      Urinalysis Basic - ( 2017 21:21 )    Color: Yellow / Appearance: Clear / S.005 / pH: x  Gluc: x / Ketone: Negative  / Bili: Negative / Urobili: Negative   Blood: x / Protein: Negative / Nitrite: Negative   Leuk Esterase: Trace / RBC: 0-2 /HPF / WBC 3-5   Sq Epi: x / Non Sq Epi: Moderate / Bacteria: Few        Assessment and Plan:  -Surgical site infection/s/p abdominoplasty: s/p IR drainage.  All cultures so far negative.  Maintain drain.  Continue antibiotic therapy.  Pt. to follow up with Plastic surgery as outpatient.

## 2017-07-18 ENCOUNTER — INPATIENT (INPATIENT)
Facility: HOSPITAL | Age: 27
LOS: 1 days | Discharge: HOME | DRG: 863 | End: 2017-07-20
Attending: INTERNAL MEDICINE | Admitting: HOSPITALIST
Payer: MEDICAID

## 2017-07-18 VITALS
RESPIRATION RATE: 19 BRPM | TEMPERATURE: 98 F | OXYGEN SATURATION: 97 % | HEART RATE: 85 BPM | SYSTOLIC BLOOD PRESSURE: 111 MMHG | WEIGHT: 180.78 LBS | DIASTOLIC BLOOD PRESSURE: 77 MMHG | HEIGHT: 67 IN

## 2017-07-18 DIAGNOSIS — Z98.890 OTHER SPECIFIED POSTPROCEDURAL STATES: Chronic | ICD-10-CM

## 2017-07-18 DIAGNOSIS — T14.8 OTHER INJURY OF UNSPECIFIED BODY REGION: ICD-10-CM

## 2017-07-18 DIAGNOSIS — Z98.51 TUBAL LIGATION STATUS: Chronic | ICD-10-CM

## 2017-07-18 DIAGNOSIS — L02.211 CUTANEOUS ABSCESS OF ABDOMINAL WALL: ICD-10-CM

## 2017-07-18 LAB
ALBUMIN SERPL ELPH-MCNC: 3.5 G/DL — SIGNIFICANT CHANGE UP (ref 3.3–5)
ALP SERPL-CCNC: 79 U/L — SIGNIFICANT CHANGE UP (ref 40–120)
ALT FLD-CCNC: 31 U/L — SIGNIFICANT CHANGE UP (ref 12–78)
ANION GAP SERPL CALC-SCNC: 7 MMOL/L — SIGNIFICANT CHANGE UP (ref 5–17)
APTT BLD: 32.2 SEC — SIGNIFICANT CHANGE UP (ref 27.5–37.4)
AST SERPL-CCNC: 12 U/L — LOW (ref 15–37)
BASOPHILS # BLD AUTO: 0.1 K/UL — SIGNIFICANT CHANGE UP (ref 0–0.2)
BASOPHILS NFR BLD AUTO: 0.8 % — SIGNIFICANT CHANGE UP (ref 0–2)
BILIRUB SERPL-MCNC: 0.4 MG/DL — SIGNIFICANT CHANGE UP (ref 0.2–1.2)
BUN SERPL-MCNC: 15 MG/DL — SIGNIFICANT CHANGE UP (ref 7–23)
CALCIUM SERPL-MCNC: 8.7 MG/DL — SIGNIFICANT CHANGE UP (ref 8.5–10.1)
CHLORIDE SERPL-SCNC: 108 MMOL/L — SIGNIFICANT CHANGE UP (ref 96–108)
CO2 SERPL-SCNC: 27 MMOL/L — SIGNIFICANT CHANGE UP (ref 22–31)
CREAT SERPL-MCNC: 0.71 MG/DL — SIGNIFICANT CHANGE UP (ref 0.5–1.3)
EOSINOPHIL # BLD AUTO: 0.3 K/UL — SIGNIFICANT CHANGE UP (ref 0–0.5)
EOSINOPHIL NFR BLD AUTO: 4.1 % — SIGNIFICANT CHANGE UP (ref 0–6)
GLUCOSE SERPL-MCNC: 80 MG/DL — SIGNIFICANT CHANGE UP (ref 70–99)
HCT VFR BLD CALC: 36.4 % — SIGNIFICANT CHANGE UP (ref 34.5–45)
HGB BLD-MCNC: 12.4 G/DL — SIGNIFICANT CHANGE UP (ref 11.5–15.5)
INR BLD: 1.19 RATIO — HIGH (ref 0.88–1.16)
LACTATE SERPL-SCNC: 0.8 MMOL/L — SIGNIFICANT CHANGE UP (ref 0.7–2)
LYMPHOCYTES # BLD AUTO: 2.2 K/UL — SIGNIFICANT CHANGE UP (ref 1–3.3)
LYMPHOCYTES # BLD AUTO: 34.2 % — SIGNIFICANT CHANGE UP (ref 13–44)
MCHC RBC-ENTMCNC: 29.1 PG — SIGNIFICANT CHANGE UP (ref 27–34)
MCHC RBC-ENTMCNC: 34 GM/DL — SIGNIFICANT CHANGE UP (ref 32–36)
MCV RBC AUTO: 85.4 FL — SIGNIFICANT CHANGE UP (ref 80–100)
MONOCYTES # BLD AUTO: 0.5 K/UL — SIGNIFICANT CHANGE UP (ref 0–0.9)
MONOCYTES NFR BLD AUTO: 7.1 % — SIGNIFICANT CHANGE UP (ref 1–9)
NEUTROPHILS # BLD AUTO: 3.5 K/UL — SIGNIFICANT CHANGE UP (ref 1.8–7.4)
NEUTROPHILS NFR BLD AUTO: 53.8 % — SIGNIFICANT CHANGE UP (ref 43–77)
PLATELET # BLD AUTO: 237 K/UL — SIGNIFICANT CHANGE UP (ref 150–400)
POTASSIUM SERPL-MCNC: 3.6 MMOL/L — SIGNIFICANT CHANGE UP (ref 3.5–5.3)
POTASSIUM SERPL-SCNC: 3.6 MMOL/L — SIGNIFICANT CHANGE UP (ref 3.5–5.3)
PROT SERPL-MCNC: 6.7 G/DL — SIGNIFICANT CHANGE UP (ref 6–8.3)
PROTHROM AB SERPL-ACNC: 13 SEC — HIGH (ref 9.8–12.7)
RBC # BLD: 4.27 M/UL — SIGNIFICANT CHANGE UP (ref 3.8–5.2)
RBC # FLD: 13.6 % — SIGNIFICANT CHANGE UP (ref 10.3–14.5)
SODIUM SERPL-SCNC: 142 MMOL/L — SIGNIFICANT CHANGE UP (ref 135–145)
WBC # BLD: 6.6 K/UL — SIGNIFICANT CHANGE UP (ref 3.8–10.5)
WBC # FLD AUTO: 6.6 K/UL — SIGNIFICANT CHANGE UP (ref 3.8–10.5)

## 2017-07-18 PROCEDURE — 93010 ELECTROCARDIOGRAM REPORT: CPT

## 2017-07-18 PROCEDURE — 99285 EMERGENCY DEPT VISIT HI MDM: CPT

## 2017-07-18 RX ORDER — PIPERACILLIN AND TAZOBACTAM 4; .5 G/20ML; G/20ML
3.38 INJECTION, POWDER, LYOPHILIZED, FOR SOLUTION INTRAVENOUS ONCE
Qty: 0 | Refills: 0 | Status: COMPLETED | OUTPATIENT
Start: 2017-07-18 | End: 2017-07-18

## 2017-07-18 RX ADMIN — PIPERACILLIN AND TAZOBACTAM 200 GRAM(S): 4; .5 INJECTION, POWDER, LYOPHILIZED, FOR SOLUTION INTRAVENOUS at 21:41

## 2017-07-18 NOTE — ED PROVIDER NOTE - OBJECTIVE STATEMENT
28 yo female s/p abdominoplasty in  on 6/22 presents to ED today for c/o of multidrug resistant E.coli grown from a wound culture of her lower abdomen from 7/12 at her doctors office.  Patient has been on augmentin but is resistant to it.  No fever/chills.  Sent in by PMD Dr. Anne Dixon for IV antibiotics

## 2017-07-18 NOTE — ED ADULT NURSE NOTE - OBJECTIVE STATEMENT
Patient came in to ED for IV antibiotics from wound culture on her lower abdomen on July 12. Patient states had tummy tuck surgery done on June 22. On assessment noted with tube drain on her right groin area with brownish fluid output. Patient denies fever at home. Patient came in to ED for IV antibiotics from resistant E coli from wound culture on her lower abdomen done on July 12. Patient states had tummy tuck surgery done on June 22. On assessment noted with tube drain on her right groin area with brownish fluid output. Patient denies fever at home.

## 2017-07-19 DIAGNOSIS — Z29.9 ENCOUNTER FOR PROPHYLACTIC MEASURES, UNSPECIFIED: ICD-10-CM

## 2017-07-19 DIAGNOSIS — T14.8 OTHER INJURY OF UNSPECIFIED BODY REGION: ICD-10-CM

## 2017-07-19 LAB
ANION GAP SERPL CALC-SCNC: 8 MMOL/L — SIGNIFICANT CHANGE UP (ref 5–17)
BUN SERPL-MCNC: 17 MG/DL — SIGNIFICANT CHANGE UP (ref 7–23)
CALCIUM SERPL-MCNC: 8.6 MG/DL — SIGNIFICANT CHANGE UP (ref 8.5–10.1)
CHLORIDE SERPL-SCNC: 108 MMOL/L — SIGNIFICANT CHANGE UP (ref 96–108)
CO2 SERPL-SCNC: 25 MMOL/L — SIGNIFICANT CHANGE UP (ref 22–31)
CREAT SERPL-MCNC: 0.68 MG/DL — SIGNIFICANT CHANGE UP (ref 0.5–1.3)
CULTURE RESULTS: SIGNIFICANT CHANGE UP
GLUCOSE SERPL-MCNC: 93 MG/DL — SIGNIFICANT CHANGE UP (ref 70–99)
HCT VFR BLD CALC: 37.1 % — SIGNIFICANT CHANGE UP (ref 34.5–45)
HGB BLD-MCNC: 12.3 G/DL — SIGNIFICANT CHANGE UP (ref 11.5–15.5)
MCHC RBC-ENTMCNC: 28.7 PG — SIGNIFICANT CHANGE UP (ref 27–34)
MCHC RBC-ENTMCNC: 33.2 GM/DL — SIGNIFICANT CHANGE UP (ref 32–36)
MCV RBC AUTO: 86.5 FL — SIGNIFICANT CHANGE UP (ref 80–100)
PLATELET # BLD AUTO: 212 K/UL — SIGNIFICANT CHANGE UP (ref 150–400)
POTASSIUM SERPL-MCNC: 3.4 MMOL/L — LOW (ref 3.5–5.3)
POTASSIUM SERPL-SCNC: 3.4 MMOL/L — LOW (ref 3.5–5.3)
RBC # BLD: 4.29 M/UL — SIGNIFICANT CHANGE UP (ref 3.8–5.2)
RBC # FLD: 13.7 % — SIGNIFICANT CHANGE UP (ref 10.3–14.5)
SODIUM SERPL-SCNC: 141 MMOL/L — SIGNIFICANT CHANGE UP (ref 135–145)
SPECIMEN SOURCE: SIGNIFICANT CHANGE UP
WBC # BLD: 6.3 K/UL — SIGNIFICANT CHANGE UP (ref 3.8–10.5)
WBC # FLD AUTO: 6.3 K/UL — SIGNIFICANT CHANGE UP (ref 3.8–10.5)

## 2017-07-19 PROCEDURE — 99223 1ST HOSP IP/OBS HIGH 75: CPT | Mod: AI,GC

## 2017-07-19 RX ORDER — ENOXAPARIN SODIUM 100 MG/ML
40 INJECTION SUBCUTANEOUS DAILY
Qty: 0 | Refills: 0 | Status: DISCONTINUED | OUTPATIENT
Start: 2017-07-19 | End: 2017-07-20

## 2017-07-19 RX ORDER — ERTAPENEM SODIUM 1 G/1
1000 INJECTION, POWDER, LYOPHILIZED, FOR SOLUTION INTRAMUSCULAR; INTRAVENOUS ONCE
Qty: 0 | Refills: 0 | Status: COMPLETED | OUTPATIENT
Start: 2017-07-19 | End: 2017-07-19

## 2017-07-19 RX ORDER — ERTAPENEM SODIUM 1 G/1
1000 INJECTION, POWDER, LYOPHILIZED, FOR SOLUTION INTRAMUSCULAR; INTRAVENOUS EVERY 24 HOURS
Qty: 0 | Refills: 0 | Status: DISCONTINUED | OUTPATIENT
Start: 2017-07-20 | End: 2017-07-20

## 2017-07-19 RX ORDER — ERTAPENEM SODIUM 1 G/1
1 INJECTION, POWDER, LYOPHILIZED, FOR SOLUTION INTRAMUSCULAR; INTRAVENOUS
Qty: 14 | Refills: 0 | OUTPATIENT
Start: 2017-07-19 | End: 2017-08-02

## 2017-07-19 RX ORDER — PIPERACILLIN AND TAZOBACTAM 4; .5 G/20ML; G/20ML
3.38 INJECTION, POWDER, LYOPHILIZED, FOR SOLUTION INTRAVENOUS EVERY 8 HOURS
Qty: 0 | Refills: 0 | Status: DISCONTINUED | OUTPATIENT
Start: 2017-07-19 | End: 2017-07-19

## 2017-07-19 RX ORDER — ERTAPENEM SODIUM 1 G/1
INJECTION, POWDER, LYOPHILIZED, FOR SOLUTION INTRAMUSCULAR; INTRAVENOUS
Qty: 0 | Refills: 0 | Status: DISCONTINUED | OUTPATIENT
Start: 2017-07-19 | End: 2017-07-20

## 2017-07-19 RX ADMIN — PIPERACILLIN AND TAZOBACTAM 25 GRAM(S): 4; .5 INJECTION, POWDER, LYOPHILIZED, FOR SOLUTION INTRAVENOUS at 04:46

## 2017-07-19 RX ADMIN — ERTAPENEM SODIUM 120 MILLIGRAM(S): 1 INJECTION, POWDER, LYOPHILIZED, FOR SOLUTION INTRAMUSCULAR; INTRAVENOUS at 12:20

## 2017-07-19 RX ADMIN — ENOXAPARIN SODIUM 40 MILLIGRAM(S): 100 INJECTION SUBCUTANEOUS at 12:21

## 2017-07-19 NOTE — H&P ADULT - NSHPLABSRESULTS_GEN_ALL_CORE
Wound culture from outpatient office reviewed    2 bacteria acenetobacter Baumanii and e. Coli with multi drug resistance    Sensitivities reviewed

## 2017-07-19 NOTE — PROGRESS NOTE ADULT - ASSESSMENT
28 yo female with PMHx of recent surgical site infection s/p abdominoplasty/liposuction in the [place], s/p discharge on 7/15/17 from [facility] for treatment, who presented to ED by her PMD for positive blood cultures,E Coli ESBL as per ID.

## 2017-07-19 NOTE — H&P ADULT - ATTENDING COMMENTS
patient seen personally by attending MD. Cultures reviewed. Wound cultures taken before prior hospitalization at  shows Acinetobacter sensitive to augmentin and e. coli resistant to augmentin. The e. coli is sensitive to zosyn, and per chart, she received one dose on 7/14/17. Plan to F/U plastic surgery and Infectious disease recommendations re: antibiotic regimen and drain removal.

## 2017-07-19 NOTE — H&P ADULT - NSHPPHYSICALEXAM_GEN_ALL_CORE
Vital Signs Last 24 Hrs  T(C): 36.7 (07-18-17 @ 22:45), Max: 36.9 (07-18-17 @ 19:48)  T(F): 98.1 (07-18-17 @ 22:45), Max: 98.5 (07-18-17 @ 19:48)  HR: 72 (07-18-17 @ 22:45) (72 - 85)  BP: 106/72 (07-18-17 @ 22:45) (106/72 - 111/77)  BP(mean): --  RR: 15 (07-18-17 @ 22:45) (15 - 19)  SpO2: 99% (07-18-17 @ 22:45) (97% - 99%)    Physical Exam:  General: Well developed, well nourished, NAD  HEENT: NCAT, PERRLA, EOMI bl, moist mucous membranes  Neck: Supple, nontender, no mass  Neurology: A&Ox3, nonfocal deficits, sensation intact  Respiratory: CTA B/L, No W/R/R  CV: RRR, +S1/S2, no murmurs, rubs or gallops  Abdominal: Soft, nontender nondistended, + BS in RLQ. + Lower abdominal surgical site which extends from right to left ASIS, surgical site is currently dry without bleeding or oozing, pinkish in color  Extremities: No C/C/E, + peripheral pulses  MSK: Normal ROM, no joint erythema or warmth, no joint swelling   Skin: 2 small (~1cm x1cm each) healing circular wounds on upper and lower back, healed under arm scars, no open surgical site on chin, normal skin turgor, normal capillary refill Vital Signs Last 24 Hrs  T(C): 36.7 (07-18-17 @ 22:45), Max: 36.9 (07-18-17 @ 19:48)  T(F): 98.1 (07-18-17 @ 22:45), Max: 98.5 (07-18-17 @ 19:48)  HR: 72 (07-18-17 @ 22:45) (72 - 85)  BP: 106/72 (07-18-17 @ 22:45) (106/72 - 111/77)  BP(mean): --  RR: 15 (07-18-17 @ 22:45) (15 - 19)  SpO2: 99% (07-18-17 @ 22:45) (97% - 99%)    Physical Exam:  General: Well developed, well nourished, NAD  HEENT: NCAT, PERRLA, EOMI bl, moist mucous membranes  Neck: Supple, nontender, no mass  Neurology: A&Ox3, nonfocal deficits, sensation intact  Respiratory: CTA B/L, No W/R/R  CV: RRR, +S1/S2, no murmurs, rubs or gallops  Abdominal: Soft, nontender nondistended, + BS in RLQ. + Lower abdominal surgical site which extends from right to left ASIS, surgical site is currently dry without bleeding or oozing, pinkish in color. +drain in RLQ draining serous fluid.  Extremities: No C/C/E, + peripheral pulses  MSK: Normal ROM, no joint erythema or warmth, no joint swelling   Skin: 2 small (~1cm x1cm each) healing circular wounds on upper and lower back, healed under arm scars, no open surgical site on chin, normal skin turgor, normal capillary refill

## 2017-07-19 NOTE — H&P ADULT - NSHPREVIEWOFSYSTEMS_GEN_ALL_CORE

## 2017-07-19 NOTE — H&P ADULT - ASSESSMENT
Patient is a 28 yo female with PMHx of recent surgical site infection s/p abdominoplasty/liposuction in the [place], s/p discharge on 7/15/17 from [facility] for treatment, who presented to ED by her PMD for positive blood cultures, admit for positive blood cultures from wound infection.

## 2017-07-19 NOTE — H&P ADULT - PROBLEM SELECTOR PLAN 1
+ outpatient blood cultures from 7/12/17. Showed Ecoli. with resistance to Augmentin  s/p 1 dose IV Zosyn in ED  Continue ______  Follow up AM CBC, BMP, Blood cultures  Infections Disease consult Dr. Hummel + outpatient blood cultures from 7/12/17. Showed Ecoli. with resistance to Augmentin  Admit to [facility]  s/p 1 dose IV Zosyn in ED  Continue ______  Follow up AM CBC, BMP, Blood cultures  Infections Disease consult Dr. Hummel + outpatient blood cultures from 7/12/17. Showed Ecoli and Acinetobacter. with resistance to Augmentin, sensitive to Zosyn  Admit to [facility]  s/p 1 dose IV Zosyn in ED  Continue Zosyn  Follow up AM CBC, BMP, Blood cultures  Infections Disease consult Dr. Hummel + outpatient wound  cultures from 7/12/17. Showed Ecoli and Acinetobacter. with resistance to Augmentin, sensitive to Zosyn  Admit to [facility]  s/p 1 dose IV Zosyn in ED  Continue Zosyn  Follow up AM CBC, BMP, Blood cultures  Infections Disease consult Dr. Hummel  Has appt with pastic surg [place], will consult + outpatient wound  cultures from 7/12/17. Showed Ecoli and Acinetobacter. with resistance to Augmentin, sensitive to Zosyn  Admit to [facility]  s/p 1 dose IV Zosyn in ED  Continue Zosyn  Follow up AM CBC, BMP, Blood cultures  Infections Disease consult Dr. Hummel  Has appt with pastic surg [facility] thursday for drain removal. will contact office for reccomendations.

## 2017-07-19 NOTE — H&P ADULT - HISTORY OF PRESENT ILLNESS
Patient is a 26 yo female with PMHx of recent surgical site infection s/p abdominoplasty/liposuction in the [place], s/p discharge on 7/15/17 from [facility] for treatment, who presented to ED by her PMD for positive blood cultures. Patient reports that on 6/22/17 she was underwent an abdominoplasty and liposuction in the . She reports the liposuction was of her back, lower chin, and bilateral arms. She failed outpatient abx therapy by her PMD. Patient reports that today she was informed that her outpatient blood cultures prior to last admission (drawn on 7/12), were positive for resistant E coli, as per documentation patient supplied from her PMD. Patient was admitted 7/13/17, was followed by plastic surgery and general surgery, she was treated with IV antibiotics and later discharged home on Augmentin 7/15/17. Blood cultures, urine culture, surgical site culture during hospital stay were all negative. Today she denies any fever, chills, headache, dizziness, chest pain, palpitations, SOB, cough, abdominal pain, nausea, vomiting, diarrhea. She denies any pain, swelling, or bleeding of abdominal surgical site. She reports she feels fine and is here due to being told by her PMD to return to ED due to the cultures showing resistance to Augmentin, which is what the patient was discharged home on.      In the ED, the patient's vital signs were stable, pt had CBC, CMP, lactate drawn. Lactate normal. Blood cultures were drawn. EKG showed: NSR, PVCs, VR 81 - unofficial read. Patient received 1 dose IV Zosyn in ED. Patient is a 26 yo female with PMHx of recent surgical site infection s/p abdominoplasty/liposuction in the [place], s/p discharge on 7/15/17 from [facility] for treatment, who presented to ED by her PMD for positive blood cultures. Patient reports that on 6/22/17 she was underwent an abdominoplasty and liposuction in the . She reports the liposuction was of her back, lower chin, and bilateral arms. She failed outpatient abx therapy by her PMD. Patient reports that today she was informed that her outpatient blood cultures prior to last admission (drawn on 7/12), were positive for resistant E coli, as per documentation patient supplied from her PMD. Patient was admitted 7/13/17, was followed by plastic surgery and general surgery, had IR drainage, was treated with IV antibiotics and later discharged home on Augmentin 7/15/17 with outpatient plastic surgery follow up. Blood cultures, urine culture, surgical site culture during hospital stay were all negative. Today she denies any fever, chills, headache, dizziness, chest pain, palpitations, SOB, cough, abdominal pain, nausea, vomiting, diarrhea. She denies any pain, swelling, or bleeding of abdominal surgical site. She reports she feels fine and is here due to being told by her PMD to return to ED due to the cultures showing resistance to Augmentin, which is what the patient was discharged home on.      In the ED, the patient's vital signs were stable, pt had CBC, CMP, lactate drawn. Lactate normal. Blood cultures were drawn. EKG showed: NSR, PVCs, VR 81 - unofficial read. Patient received 1 dose IV Zosyn in ED.

## 2017-07-19 NOTE — CONSULT NOTE ADULT - SUBJECTIVE AND OBJECTIVE BOX
HPI:  Patient is a 28 yo female with PMHx of recent surgical site infection s/p abdominoplasty/liposuction in the [place], s/p discharge on 7/15/17 from [facility] for treatment, who presented to ED by her PMD for positive blood cultures. Patient reports that on 6/22/17 she was underwent an abdominoplasty and liposuction in the . She reports the liposuction was of her back, lower chin, and bilateral arms. She failed outpatient abx therapy by her PMD. Patient reports that today she was informed that her outpatient blood cultures prior to last admission (drawn on 7/12), were positive for resistant E coli, as per documentation patient supplied from her PMD. Patient was admitted 7/13/17, was followed by plastic surgery and general surgery, had IR drainage, was treated with IV antibiotics and later discharged home on Augmentin 7/15/17 with outpatient plastic surgery follow up. Blood cultures, urine culture, surgical site culture during hospital stay were all negative. Today she denies any fever, chills, headache, dizziness, chest pain, palpitations, SOB, cough, abdominal pain, nausea, vomiting, diarrhea. She denies any pain, swelling, or bleeding of abdominal surgical site. She reports she feels fine and is here due to being told by her PMD to return to ED due to the cultures showing resistance to Augmentin, which is what the patient was discharged home on.      In the ED, the patient's vital signs were stable, pt had CBC, CMP, lactate drawn. Lactate normal. Blood cultures were drawn. EKG showed: NSR, PVCs, VR 81 - unofficial read. Patient received 1 dose IV Zosyn in ED. (19 Jul 2017 01:17)    Patient now reporting decreased but continued drainage from rt lower abd surgical site. No fevers, not feeling poorly.      PAST MEDICAL & SURGICAL HISTORY:  No pertinent past medical history  H/O tubal ligation  S/P plastic surgery: Chin, bilateral under arms, abdomen  History of abdominoplasty      Antimicrobials  ertapenem  IVPB   IV Intermittent       Immunological      Other  enoxaparin Injectable 40 milliGRAM(s) SubCutaneous daily      Allergies    No Known Allergies    Intolerances        SOCIAL HISTORY:  no current tobacco use reported. 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    FAMILY HISTORY:  Family history of hyperlipidemia  Family history of hypertension  Family history of diabetes mellitus      ROS:    EYES:  Negative  blurry vision or double vision  GASTROINTESTINAL:  Negative for nausea, vomiting, diarrhea  -otherwise negative except for subjective    Vital Signs Last 24 Hrs  T(C): 36.7 (19 Jul 2017 03:46), Max: 36.9 (18 Jul 2017 19:48)  T(F): 98.1 (19 Jul 2017 03:46), Max: 98.5 (18 Jul 2017 19:48)  HR: 75 (19 Jul 2017 03:46) (72 - 86)  BP: 103/71 (19 Jul 2017 03:46) (96/65 - 111/77)  BP(mean): --  RR: 16 (19 Jul 2017 03:46) (14 - 19)  SpO2: 100% (19 Jul 2017 03:46) (97% - 100%)    PE:  WDWN in no distress  HEENT:  NC, PERRL, sclerae anicteric, conjunctivae clear, EOMI.  Sinuses nontender, no nasal exudate.  No buccal or pharyngeal lesions, erythema or exudate  Neck:  Supple, no adenopathy  Lungs:  No accessory muscle use, bilaterally clear to auscultation  Cor:  RRR, S1, S2, no murmur appreciated  Abd:  Symmetric, normoactive BS.  Soft, nontender, no masses, guarding or rebound.  Liver and spleen not enlarged  Extrem:  No cyanosis or edema  Skin:  surgical sites look clean except for RLQ abd drain with no surrounding erythema or inflammation. drainage serosanguinous  Neuro: grossly intact  Musc: moving all limbs freely, no focal deficits    LABS:                        12.3   6.3   )-----------( 212      ( 19 Jul 2017 06:26 )             37.1     07-19    141  |  108  |  17  ----------------------------<  93  3.4<L>   |  25  |  0.68    Ca    8.6      19 Jul 2017 06:26    TPro  6.7  /  Alb  3.5  /  TBili  0.4  /  DBili  x   /  AST  12<L>  /  ALT  31  /  AlkPhos  79  07-18        MICROBIOLOGY:  Wound Aerobic:  Acinetobacter-  Augmentin-S  Cefepime-S  Meropenem-S  E Coli  Ertapenem-S  -resistant to multiple abx    Culture - Blood (07.14.17 @ 00:11)    Specimen Source: .Blood Blood-Venous    Culture Results:   No growth at 5 days.        RADIOLOGY & ADDITIONAL STUDIES:    --

## 2017-07-19 NOTE — ED ADULT NURSE REASSESSMENT NOTE - NS ED NURSE REASSESS COMMENT FT1
I called up Dr. Reyes if he wants to put patient on isolation contact precaution and he said no need. ANNE Foster made aware. No isolation precaution.

## 2017-07-19 NOTE — PROGRESS NOTE ADULT - SUBJECTIVE AND OBJECTIVE BOX
INTERVAL HPI/OVERNIGHT EVENTS: 26 yo F with hx of liposuction s/p infection followed by plastic surgery.  Patient seen and examined at bedside. Pending PICC line. ID Dr. Rome consulted. Needs IV antibiotics given persistent infection.    MEDICATIONS  (STANDING):  enoxaparin Injectable 40 milliGRAM(s) SubCutaneous daily  ertapenem  IVPB   IV Intermittent     MEDICATIONS  (PRN):      Allergies    No Known Allergies    Intolerances        CONSTITUTIONAL: No weakness, fevers or chills  RESPIRATORY: No cough, wheezing, hemoptysis; No shortness of breath  Cvs: No chest pain or palpitations  Gi: No abdominal pain. No nausea, vomiting, diarrhea or constipation. No melena or hematochezia.  Neuro: no weakness    All other review of systems is negative unless indicated above.    Vital Signs Last 24 Hrs  T(C): 36.9 (19 Jul 2017 14:02), Max: 36.9 (18 Jul 2017 19:48)  T(F): 98.5 (19 Jul 2017 14:02), Max: 98.5 (18 Jul 2017 19:48)  HR: 76 (19 Jul 2017 14:02) (72 - 86)  BP: 94/67 (19 Jul 2017 14:02) (94/67 - 111/77)  BP(mean): --  RR: 16 (19 Jul 2017 14:02) (14 - 19)  SpO2: 100% (19 Jul 2017 14:02) (97% - 100%)    General: NAD  Neurology: A&Ox3 , nonfocal  Respiratory: CTA B/L  CV: RRR, S1S2  Abdominal: Soft, mild tenderness, ND +BS, Drains in place  Extremities: No edema, + peripheral pulses      LABS:                        12.3   6.3   )-----------( 212      ( 19 Jul 2017 06:26 )             37.1     07-19    141  |  108  |  17  ----------------------------<  93  3.4<L>   |  25  |  0.68    Ca    8.6      19 Jul 2017 06:26    TPro  6.7  /  Alb  3.5  /  TBili  0.4  /  DBili  x   /  AST  12<L>  /  ALT  31  /  AlkPhos  79  07-18    PT/INR - ( 18 Jul 2017 21:38 )   PT: 13.0 sec;   INR: 1.19 ratio         PTT - ( 18 Jul 2017 21:38 )  PTT:32.2 sec    INTERVAL HPI/OVERNIGHT EVENTS: Patient seen and examined at bedside. No acute events overnight.     MEDICATIONS  (STANDING):  enoxaparin Injectable 40 milliGRAM(s) SubCutaneous daily  ertapenem  IVPB   IV Intermittent     MEDICATIONS  (PRN):      Allergies    No Known Allergies    Intolerances        CONSTITUTIONAL: No weakness, fevers or chills  RESPIRATORY: No cough, wheezing, hemoptysis; No shortness of breath  Cvs: No chest pain or palpitations  Gi: No abdominal pain. No nausea, vomiting, diarrhea or constipation. No melena or hematochezia.  Neuro: no weakness    All other review of systems is negative unless indicated above.    Vital Signs Last 24 Hrs  T(C): 36.9 (19 Jul 2017 14:02), Max: 36.9 (18 Jul 2017 19:48)  T(F): 98.5 (19 Jul 2017 14:02), Max: 98.5 (18 Jul 2017 19:48)  HR: 76 (19 Jul 2017 14:02) (72 - 86)  BP: 94/67 (19 Jul 2017 14:02) (94/67 - 111/77)  BP(mean): --  RR: 16 (19 Jul 2017 14:02) (14 - 19)  SpO2: 100% (19 Jul 2017 14:02) (97% - 100%)    General: NAD  Neurology: A&Ox , nonfocal  Respiratory: CTA B/L  CV: RRR, S1S2  Abdominal: Soft, NT, ND +BS  Extremities: No edema, + peripheral pulses      LABS:                        12.3   6.3   )-----------( 212      ( 19 Jul 2017 06:26 )             37.1     07-19    141  |  108  |  17  ----------------------------<  93  3.4<L>   |  25  |  0.68    Ca    8.6      19 Jul 2017 06:26    TPro  6.7  /  Alb  3.5  /  TBili  0.4  /  DBili  x   /  AST  12<L>  /  ALT  31  /  AlkPhos  79  07-18    PT/INR - ( 18 Jul 2017 21:38 )   PT: 13.0 sec;   INR: 1.19 ratio         PTT - ( 18 Jul 2017 21:38 )  PTT:32.2 sec      RADIOLOGY & ADDITIONAL TESTS:        RADIOLOGY & ADDITIONAL TESTS:

## 2017-07-19 NOTE — CONSULT NOTE ADULT - PROBLEM SELECTOR RECOMMENDATION 9
with sensitivities no good oral options, recommend placing PICC and putting patient on course of IV abx.  Will base duration on response, decrease in drainage and ability to remove drain and drop in inflammatory markers. Minimum duration 2 weeks but may extend past this based on course.  -Place PICC ordered  -Ertapenem 1 gram IV Q-day minimum 2 weeks  -weekly CBC, BMP, ESR    Thank you for consulting us and involving us in the management of this most interesting and challenging case.     We will follow along in the care of this patient.

## 2017-07-20 ENCOUNTER — TRANSCRIPTION ENCOUNTER (OUTPATIENT)
Age: 27
End: 2017-07-20

## 2017-07-20 VITALS
TEMPERATURE: 99 F | SYSTOLIC BLOOD PRESSURE: 104 MMHG | RESPIRATION RATE: 18 BRPM | HEART RATE: 73 BPM | DIASTOLIC BLOOD PRESSURE: 72 MMHG | OXYGEN SATURATION: 99 %

## 2017-07-20 LAB
ANION GAP SERPL CALC-SCNC: 6 MMOL/L — SIGNIFICANT CHANGE UP (ref 5–17)
BUN SERPL-MCNC: 10 MG/DL — SIGNIFICANT CHANGE UP (ref 7–23)
CALCIUM SERPL-MCNC: 8.7 MG/DL — SIGNIFICANT CHANGE UP (ref 8.5–10.1)
CHLORIDE SERPL-SCNC: 108 MMOL/L — SIGNIFICANT CHANGE UP (ref 96–108)
CO2 SERPL-SCNC: 28 MMOL/L — SIGNIFICANT CHANGE UP (ref 22–31)
CREAT SERPL-MCNC: 0.61 MG/DL — SIGNIFICANT CHANGE UP (ref 0.5–1.3)
GLUCOSE SERPL-MCNC: 91 MG/DL — SIGNIFICANT CHANGE UP (ref 70–99)
POTASSIUM SERPL-MCNC: 4 MMOL/L — SIGNIFICANT CHANGE UP (ref 3.5–5.3)
POTASSIUM SERPL-SCNC: 4 MMOL/L — SIGNIFICANT CHANGE UP (ref 3.5–5.3)
SODIUM SERPL-SCNC: 142 MMOL/L — SIGNIFICANT CHANGE UP (ref 135–145)

## 2017-07-20 PROCEDURE — 99285 EMERGENCY DEPT VISIT HI MDM: CPT | Mod: 25

## 2017-07-20 PROCEDURE — 87040 BLOOD CULTURE FOR BACTERIA: CPT

## 2017-07-20 PROCEDURE — 36569 INSJ PICC 5 YR+ W/O IMAGING: CPT

## 2017-07-20 PROCEDURE — 83605 ASSAY OF LACTIC ACID: CPT

## 2017-07-20 PROCEDURE — 77001 FLUOROGUIDE FOR VEIN DEVICE: CPT | Mod: 26

## 2017-07-20 PROCEDURE — 85610 PROTHROMBIN TIME: CPT

## 2017-07-20 PROCEDURE — C1894: CPT

## 2017-07-20 PROCEDURE — 80048 BASIC METABOLIC PNL TOTAL CA: CPT

## 2017-07-20 PROCEDURE — 76937 US GUIDE VASCULAR ACCESS: CPT

## 2017-07-20 PROCEDURE — 76937 US GUIDE VASCULAR ACCESS: CPT | Mod: 26

## 2017-07-20 PROCEDURE — 99239 HOSP IP/OBS DSCHRG MGMT >30: CPT

## 2017-07-20 PROCEDURE — C1769: CPT

## 2017-07-20 PROCEDURE — C1751: CPT

## 2017-07-20 PROCEDURE — 96376 TX/PRO/DX INJ SAME DRUG ADON: CPT

## 2017-07-20 PROCEDURE — 96372 THER/PROPH/DIAG INJ SC/IM: CPT | Mod: 59

## 2017-07-20 PROCEDURE — 85027 COMPLETE CBC AUTOMATED: CPT

## 2017-07-20 PROCEDURE — 77001 FLUOROGUIDE FOR VEIN DEVICE: CPT

## 2017-07-20 PROCEDURE — 93005 ELECTROCARDIOGRAM TRACING: CPT

## 2017-07-20 PROCEDURE — 96375 TX/PRO/DX INJ NEW DRUG ADDON: CPT

## 2017-07-20 PROCEDURE — 80053 COMPREHEN METABOLIC PANEL: CPT

## 2017-07-20 PROCEDURE — 96365 THER/PROPH/DIAG IV INF INIT: CPT

## 2017-07-20 PROCEDURE — 85652 RBC SED RATE AUTOMATED: CPT

## 2017-07-20 PROCEDURE — 86140 C-REACTIVE PROTEIN: CPT

## 2017-07-20 PROCEDURE — 85730 THROMBOPLASTIN TIME PARTIAL: CPT

## 2017-07-20 RX ORDER — MULTIVIT-MIN/FERROUS GLUCONATE 9 MG/15 ML
1 LIQUID (ML) ORAL
Qty: 0 | Refills: 0 | COMMUNITY
Start: 2017-07-20

## 2017-07-20 RX ORDER — ASCORBIC ACID 60 MG
1 TABLET,CHEWABLE ORAL
Qty: 0 | Refills: 0 | COMMUNITY
Start: 2017-07-20

## 2017-07-20 RX ORDER — ASCORBIC ACID 60 MG
500 TABLET,CHEWABLE ORAL
Qty: 0 | Refills: 0 | Status: CANCELLED | OUTPATIENT
Start: 2017-07-20 | End: 2017-07-20

## 2017-07-20 RX ORDER — MULTIVIT-MIN/FERROUS GLUCONATE 9 MG/15 ML
1 LIQUID (ML) ORAL DAILY
Qty: 0 | Refills: 0 | Status: CANCELLED | OUTPATIENT
Start: 2017-07-20 | End: 2017-07-20

## 2017-07-20 RX ADMIN — ERTAPENEM SODIUM 120 MILLIGRAM(S): 1 INJECTION, POWDER, LYOPHILIZED, FOR SOLUTION INTRAMUSCULAR; INTRAVENOUS at 11:20

## 2017-07-20 RX ADMIN — ENOXAPARIN SODIUM 40 MILLIGRAM(S): 100 INJECTION SUBCUTANEOUS at 11:19

## 2017-07-20 NOTE — DISCHARGE NOTE ADULT - PATIENT PORTAL LINK FT
“You can access the [facility] Patient Portal, offered by [facility], by registering with the following website: [email or web address]”

## 2017-07-20 NOTE — PROGRESS NOTE ADULT - ASSESSMENT
26 yo female with recent liposuction tummy tuck now with polymicrobial infection of surgical site with drain in place

## 2017-07-20 NOTE — PROGRESS NOTE ADULT - PROBLEM SELECTOR PLAN 1
+ outpatient wound  cultures from 7/12/17. Showed ESBL Ecoli and Acinetobacter. with resistance to Augmentin, sensitive to Zosyn  Admit to [facility]  s/p 1 dose IV Zosyn in ED  Continue Zosyn  Follow up AM CBC, BMP, Blood cultures  Infections Disease consult Dr. Rome  PICC line to be placed  IV antibiotics for 2-4 weeks as per ID  [facility]ursday for [facility] removal
+ outpatient wound  cultures from 7/12/17. Showed ESBL Ecoli and Acinetobacter. with resistance to Augmentin, sensitive to Zosyn  Continue IV ertapenem  Discharge with PICC and IV abx
Minimum duration 2 weeks but may extend past this based on course.  -Place PICC ordered  -Ertapenem 1 gram IV Q-day minimum 2 weeks  -weekly CBC, BMP, ESR  -patient will call to see me in office in 2 weeks.    Thank you for consulting us and involving us in the management of this most interesting and challenging case.     Please Call with any further questions

## 2017-07-20 NOTE — DISCHARGE NOTE ADULT - MEDICATION SUMMARY - MEDICATIONS TO TAKE
I will START or STAY ON the medications listed below when I get home from the hospital:    ertapenem 1 g injection  -- 1 gram(s) injectable once a day  -- Indication: For Wound infection    Multiple Vitamins with Minerals oral tablet  -- 1 tab(s) by mouth once a day  -- Indication: For Need for prophylactic measure    ascorbic acid 500 mg oral tablet  -- 1 tab(s) by mouth 2 times a day  -- Indication: For Need for prophylactic measure

## 2017-07-20 NOTE — DISCHARGE NOTE ADULT - CARE PLAN
Principal Discharge DX:	Wound infection  Goal:	stable  Instructions for follow-up, activity and diet:	Continue IV ertapenem as per ID  Follow up with Dr. Rome within 1-2 weeks as scheduled  Secondary Diagnosis:	History of abdominoplasty  Instructions for follow-up, activity and diet:	stable

## 2017-07-20 NOTE — PROGRESS NOTE ADULT - SUBJECTIVE AND OBJECTIVE BOX
infectious diseases progress note:    DIANNA BURDEN is a 27y y. o. Female patient    Patient reports: some nausea with IV abx yesterday but was on empty stomch    ROS:    EYES:  Negative  blurry vision or double vision  GASTROINTESTINAL:  Negative for, vomiting, diarrhea  -otherwise negative except for subjective    Allergies    No Known Allergies    Intolerances        ANTIBIOTICS/RELEVANT:  antimicrobials  ertapenem  IVPB   IV Intermittent   ertapenem  IVPB 1000 milliGRAM(s) IV Intermittent every 24 hours    immunologic:    OTHER:  enoxaparin Injectable 40 milliGRAM(s) SubCutaneous daily      Objective:  Vital Signs Last 24 Hrs  T(C): 36.7 (20 Jul 2017 04:25), Max: 36.9 (19 Jul 2017 14:02)  T(F): 98 (20 Jul 2017 04:25), Max: 98.5 (19 Jul 2017 14:02)  HR: 73 (20 Jul 2017 04:25) (66 - 76)  BP: 100/68 (20 Jul 2017 04:25) (94/67 - 101/69)  BP(mean): --  RR: 17 (20 Jul 2017 04:25) (16 - 17)  SpO2: 100% (20 Jul 2017 04:25) (99% - 100%)    T(C): 36.7 (20 Jul 2017 04:25), Max: 36.9 (18 Jul 2017 19:48)  T(C): 36.7 (20 Jul 2017 04:25), Max: 36.9 (18 Jul 2017 19:48)  T(C): 36.7 (20 Jul 2017 04:25), Max: 36.9 (18 Jul 2017 19:48)    PHYSICAL EXAM:  Constitutional:Well-developed, well nourished  Eyes:PERRLA, EOMI  Ear/Nose/Throat: oropharynx normal	  Neck:no JVD, no lymphadenopathy, supple  Respiratory: no accessory muscle use  Cardiovascular:RRR,   Gastrointestinal:soft, NT  Extremities:no clubbing, no cyanosis, edema absent  Skin: drain rt LQ with decreased amount of clear yellow fluid draining      LABS:                        12.3   6.3   )-----------( 212      ( 19 Jul 2017 06:26 )             37.1       6.3 07-19 @ 06:26  6.6 07-18 @ 21:38  4.8 07-15 @ 07:23  4.8 07-14 @ 08:56  6.8 07-13 @ 17:55      07-19    141  |  108  |  17  ----------------------------<  93  3.4<L>   |  25  |  0.68    Ca    8.6      19 Jul 2017 06:26    TPro  6.7  /  Alb  3.5  /  TBili  0.4  /  DBili  x   /  AST  12<L>  /  ALT  31  /  AlkPhos  79  07-18    PT/INR - ( 18 Jul 2017 21:38 )   PT: 13.0 sec;   INR: 1.19 ratio         PTT - ( 18 Jul 2017 21:38 )  PTT:32.2 sec        MICROBIOLOGY:          RADIOLOGY & ADDITIONAL STUDIES:

## 2017-07-20 NOTE — PROGRESS NOTE ADULT - ASSESSMENT
28 yo female with PMHx of recent surgical site infection s/p abdominoplasty/liposuction in the [place], s/p discharge on 7/15/17 from [facility] for treatment, who presented to ED by her PMD for positive blood cultures,E Coli ESBL as per ID.

## 2017-07-20 NOTE — DISCHARGE NOTE ADULT - NS MD DC FALL RISK RISK
For information on Fall & Injury Prevention, visit [email or web address]
[email or web address]

## 2017-07-20 NOTE — DISCHARGE NOTE ADULT - CARE PROVIDER_API CALL
Catrachito Rome (MD; PhD), Infectious Disease; Internal Medicine  [address] [place] [address]  Phone: [phone]  Fax: [phone]

## 2017-07-20 NOTE — DISCHARGE NOTE ADULT - HOSPITAL COURSE
28 yo female with PMHx of recent surgical site infection s/p abdominoplasty/liposuction in the [place], s/p discharge on 7/15/17 from [facility] for treatment, who presented to ED by her PMD for positive blood cultures. Patient reports that on 6/22/17 she was underwent an abdominoplasty and liposuction in the [place]. She reports the liposuction was of her back, lower chin, and bilateral arms. She failed outpatient abx therapy by her PMD. Patient reports that today she was informed that her outpatient blood cultures prior to last admission (drawn on 7/12), were positive for resistant E coli, as per documentation patient supplied from her PMD. Patient was admitted 7/13/17, was followed by plastic surgery and general surgery, had IR drainage, was treated with IV antibiotics and later discharged home on Augmentin 7/15/17 with outpatient plastic surgery follow up. Blood cultures, urine culture, surgical site culture during hospital stay were all negative. Today she denies any fever, chills, headache, dizziness, chest pain, palpitations, SOB, cough, abdominal pain, nausea, vomiting, diarrhea. She denies any pain, swelling, or bleeding of abdominal surgical site. She reports she feels fine and is here due to being told by her PMD to return to ED due to the cultures showing resistance to Augmentin, which is what the patient was discharged home on.      In the ED, the patient's vital signs were stable, pt had CBC, CMP, lactate drawn. Lactate normal. Blood cultures were drawn. EKG showed: NSR, PVCs, VR 81 - unofficial read. Patient received 1 dose IV Zosyn in ED.       Patient was admitted to [facility]. She was evaluated by Infectious disease Dr. Rome. Cultures for E Coli ESBL and Actinobacter. Patient had a PICC line placed.  Antibiotics Ertapenem started. She will be discharged to home on IV antibiotics. She remains hemodynamically stable for discharge with outpatient ID follow up as scheduled.

## 2017-07-20 NOTE — PROGRESS NOTE ADULT - SUBJECTIVE AND OBJECTIVE BOX
INTERVAL HPI/OVERNIGHT EVENTS: 28 yo F with hx of liposuction s/p infection followed by plastic surgery.  Patient seen and examined at bedside. PICC line in place. ID Dr. Rome following for wound infection  Discharge home today on IV abx.    MEDICATIONS  (STANDING):  enoxaparin Injectable 40 milliGRAM(s) SubCutaneous daily  ertapenem  IVPB   IV Intermittent     MEDICATIONS  (PRN):      Allergies    No Known Allergies    Intolerances        CONSTITUTIONAL: No weakness, fevers or chills  RESPIRATORY: No cough, wheezing, hemoptysis; No shortness of breath  Cvs: No chest pain or palpitations  Gi: No abdominal pain. No nausea, vomiting, diarrhea or constipation. No melena or hematochezia.  Neuro: no weakness    All other review of systems is negative unless indicated above.    Vital Signs Last 24 Hrs  T(C): 36.7 (20 Jul 2017 04:25), Max: 36.9 (19 Jul 2017 14:02)  T(F): 98 (20 Jul 2017 04:25), Max: 98.5 (19 Jul 2017 14:02)  HR: 73 (20 Jul 2017 04:25) (66 - 76)  BP: 100/68 (20 Jul 2017 04:25) (94/67 - 101/69)  BP(mean): --  RR: 17 (20 Jul 2017 04:25) (16 - 17)  SpO2: 100% (20 Jul 2017 04:25) (99% - 100%)    General: NAD  Neurology: A&Ox3 , nonfocal  Respiratory: CTA B/L  CV: RRR, S1S2  Abdominal: Soft, mild tenderness, ND +BS, Drains in place  Extremities: No edema, + peripheral pulses      LABS:                        12.3   6.3   )-----------( 212      ( 19 Jul 2017 06:26 )             37.1     07-20    142  |  108  |  10  ----------------------------<  91  4.0   |  28  |  0.61    Ca    8.7      20 Jul 2017 08:16    TPro  6.7  /  Alb  3.5  /  TBili  0.4  /  DBili  x   /  AST  12<L>  /  ALT  31  /  AlkPhos  79  07-18    PT/INR - ( 18 Jul 2017 21:38 )   PT: 13.0 sec;   INR: 1.19 ratio         PTT - ( 18 Jul 2017 21:38 )  PTT:32.2 sec      I      RADIOLOGY & ADDITIONAL TESTS:

## 2017-07-23 PROCEDURE — 82150 ASSAY OF AMYLASE: CPT

## 2017-07-23 PROCEDURE — 84702 CHORIONIC GONADOTROPIN TEST: CPT

## 2017-07-23 PROCEDURE — 99285 EMERGENCY DEPT VISIT HI MDM: CPT | Mod: 25

## 2017-07-23 PROCEDURE — 71045 X-RAY EXAM CHEST 1 VIEW: CPT

## 2017-07-23 PROCEDURE — 86140 C-REACTIVE PROTEIN: CPT

## 2017-07-23 PROCEDURE — 83605 ASSAY OF LACTIC ACID: CPT

## 2017-07-23 PROCEDURE — 96372 THER/PROPH/DIAG INJ SC/IM: CPT | Mod: 59

## 2017-07-23 PROCEDURE — 85027 COMPLETE CBC AUTOMATED: CPT

## 2017-07-23 PROCEDURE — 83690 ASSAY OF LIPASE: CPT

## 2017-07-23 PROCEDURE — 85652 RBC SED RATE AUTOMATED: CPT

## 2017-07-23 PROCEDURE — 74177 CT ABD & PELVIS W/CONTRAST: CPT

## 2017-07-23 PROCEDURE — 87205 SMEAR GRAM STAIN: CPT

## 2017-07-23 PROCEDURE — 80053 COMPREHEN METABOLIC PANEL: CPT

## 2017-07-23 PROCEDURE — 84145 PROCALCITONIN (PCT): CPT

## 2017-07-23 PROCEDURE — C1894: CPT

## 2017-07-23 PROCEDURE — 76942 ECHO GUIDE FOR BIOPSY: CPT

## 2017-07-23 PROCEDURE — C1729: CPT

## 2017-07-23 PROCEDURE — 87086 URINE CULTURE/COLONY COUNT: CPT

## 2017-07-23 PROCEDURE — 49406 IMAGE CATH FLUID PERI/RETRO: CPT

## 2017-07-23 PROCEDURE — 87075 CULTR BACTERIA EXCEPT BLOOD: CPT

## 2017-07-23 PROCEDURE — 96365 THER/PROPH/DIAG IV INF INIT: CPT | Mod: 59

## 2017-07-23 PROCEDURE — C1769: CPT

## 2017-07-23 PROCEDURE — 87040 BLOOD CULTURE FOR BACTERIA: CPT

## 2017-07-23 PROCEDURE — 96376 TX/PRO/DX INJ SAME DRUG ADON: CPT

## 2017-07-23 PROCEDURE — 80048 BASIC METABOLIC PNL TOTAL CA: CPT

## 2017-07-23 PROCEDURE — 87070 CULTURE OTHR SPECIMN AEROBIC: CPT

## 2017-07-23 PROCEDURE — 81001 URINALYSIS AUTO W/SCOPE: CPT
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
## 2017-07-24 DIAGNOSIS — Z79.2 LONG TERM (CURRENT) USE OF ANTIBIOTICS: ICD-10-CM

## 2017-07-24 DIAGNOSIS — Z16.11 RESISTANCE TO PENICILLINS: ICD-10-CM

## 2017-07-24 DIAGNOSIS — T81.4XXA INFECTION FOLLOWING A PROCEDURE, INITIAL ENCOUNTER: ICD-10-CM
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
## 2017-07-24 DIAGNOSIS — B96.20 UNSPECIFIED ESCHERICHIA COLI [E. COLI] AS THE CAUSE OF DISEASES CLASSIFIED ELSEWHERE: ICD-10-CM

## 2017-07-24 LAB
CULTURE RESULTS: SIGNIFICANT CHANGE UP
CULTURE RESULTS: SIGNIFICANT CHANGE UP
SPECIMEN SOURCE: SIGNIFICANT CHANGE UP
SPECIMEN SOURCE: SIGNIFICANT CHANGE UP

## 2017-07-28 ENCOUNTER — OUTPATIENT (OUTPATIENT)
Dept: OUTPATIENT SERVICES | Facility: HOSPITAL | Age: 27
LOS: 1 days | End: 2017-07-28
Payer: MEDICAID

## 2017-07-28 DIAGNOSIS — Z98.890 OTHER SPECIFIED POSTPROCEDURAL STATES: Chronic | ICD-10-CM

## 2017-07-28 DIAGNOSIS — T81.4XXA INFECTION FOLLOWING A PROCEDURE, INITIAL ENCOUNTER: ICD-10-CM

## 2017-07-28 DIAGNOSIS — Z16.35 RESISTANCE TO MULTIPLE ANTIMICROBIAL DRUGS: ICD-10-CM

## 2017-07-28 DIAGNOSIS — Z98.51 TUBAL LIGATION STATUS: Chronic | ICD-10-CM

## 2017-07-28 PROCEDURE — 76000 FLUOROSCOPY <1 HR PHYS/QHP: CPT

## 2017-07-28 PROCEDURE — 76942 ECHO GUIDE FOR BIOPSY: CPT

## 2017-07-28 PROCEDURE — C1729: CPT

## 2017-07-28 PROCEDURE — 87070 CULTURE OTHR SPECIMN AEROBIC: CPT

## 2017-07-28 PROCEDURE — 72192 CT PELVIS W/O DYE: CPT

## 2017-07-28 PROCEDURE — 87205 SMEAR GRAM STAIN: CPT

## 2017-07-28 PROCEDURE — 75984 XRAY CONTROL CATHETER CHANGE: CPT

## 2017-07-28 PROCEDURE — 72192 CT PELVIS W/O DYE: CPT | Mod: 26

## 2017-07-28 PROCEDURE — C1769: CPT

## 2017-08-02 LAB
CULTURE RESULTS: SIGNIFICANT CHANGE UP
SPECIMEN SOURCE: SIGNIFICANT CHANGE UP

## 2017-08-07 ENCOUNTER — OUTPATIENT (OUTPATIENT)
Dept: OUTPATIENT SERVICES | Facility: HOSPITAL | Age: 27
LOS: 1 days | End: 2017-08-07
Payer: MEDICAID

## 2017-08-07 DIAGNOSIS — Z98.890 OTHER SPECIFIED POSTPROCEDURAL STATES: Chronic | ICD-10-CM

## 2017-08-07 DIAGNOSIS — Z98.51 TUBAL LIGATION STATUS: Chronic | ICD-10-CM

## 2017-08-07 DIAGNOSIS — T81.4XXA INFECTION FOLLOWING A PROCEDURE, INITIAL ENCOUNTER: ICD-10-CM

## 2017-08-07 PROCEDURE — 74176 CT ABD & PELVIS W/O CONTRAST: CPT | Mod: 26

## 2017-08-07 PROCEDURE — 74176 CT ABD & PELVIS W/O CONTRAST: CPT

## 2018-01-22 NOTE — ED PROVIDER NOTE - CARDIAC, MLM
Diagnosis:    Protocol/Chemo Regimen:  Day:     Pt endorsed:    Review of Systems:    Pain scale:                                Location:    Diet:     Allergies    No Known Allergies    Intolerances        ANTIMICROBIALS      HEME/ONC MEDICATIONS  CARBOplatin IVPB 750 milliGRAM(s) IV Intermittent once  etoposide IVPB 215 milliGRAM(s) IV Intermittent daily  ifosfamide IVPB w/additives 33607 milliGRAM(s) IV Intermittent once      STANDING MEDICATIONS  ondansetron Injectable 8 milliGRAM(s) IV Push every 8 hours  sodium chloride 0.9%. 1000 milliLiter(s) IV Continuous <Continuous>      PRN MEDICATIONS  metoclopramide Injectable 10 milliGRAM(s) IV Push every 6 hours PRN        Vital Signs Last 24 Hrs  T(C): 36.8 (22 Jan 2018 05:31), Max: 36.8 (22 Jan 2018 05:31)  T(F): 98.3 (22 Jan 2018 05:31), Max: 98.3 (22 Jan 2018 05:31)  HR: 64 (22 Jan 2018 05:31) (64 - 80)  BP: 111/74 (22 Jan 2018 05:31) (95/58 - 117/73)  BP(mean): --  RR: 18 (22 Jan 2018 05:31) (18 - 18)  SpO2: 98% (22 Jan 2018 05:31) (97% - 100%)    PHYSICAL EXAM  General: adult in NAD  HEENT: clear oropharynx, anicteric sclera, pink conjunctiva  Neck: supple  CV: normal S1/S2 with no murmur rubs or gallops  Lungs: positive air movement b/l ant lungs,clear to auscultation, no wheezes, no rales  Abdomen: soft non-tender non-distended, no hepatosplenomegaly  Ext: no clubbing cyanosis or edema  Skin: no rashes and no petechiae  Neuro: alert and oriented X 4, no focal deficits  Central Line: normal    LABS:    Blood Cultures:                           13.1   5.4   )-----------( 221      ( 22 Jan 2018 07:06 )             42.2         Mean Cell Volume : 88.0 fl  Mean Cell Hemoglobin : 27.4 pg  Mean Cell Hemoglobin Concentration : 31.1 gm/dL  Auto Neutrophil # : x  Auto Lymphocyte # : x  Auto Monocyte # : x  Auto Eosinophil # : x  Auto Basophil # : x  Auto Neutrophil % : x  Auto Lymphocyte % : x  Auto Monocyte % : x  Auto Eosinophil % : x  Auto Basophil % : x      01-22    139  |  106  |  11  ----------------------------<  78  3.5   |  23  |  0.70    Ca    8.3<L>      22 Jan 2018 07:06  Phos  2.9     01-22  Mg     1.8     01-22    TPro  5.2<L>  /  Alb  2.8<L>  /  TBili  0.5  /  DBili  x   /  AST  17  /  ALT  31  /  AlkPhos  73  01-22      Mg 1.8  Phos 2.9      PT/INR - ( 20 Jan 2018 14:23 )   PT: 12.0 sec;   INR: 1.10 ratio         PTT - ( 20 Jan 2018 14:23 )  PTT:33.7 sec        Culture:  RECENT CULTURES:      MICROBIOLOGY:    CARBOplatin IVPB 750 milliGRAM(s) IV Intermittent once  etoposide IVPB 215 milliGRAM(s) IV Intermittent daily  ifosfamide IVPB w/additives 86294 milliGRAM(s) IV Intermittent once    ondansetron Injectable 8 milliGRAM(s) IV Push every 8 hours  sodium chloride 0.9%. 1000 milliLiter(s) IV Continuous <Continuous>      RADIOLOGY & ADDITIONAL STUDIES: Diagnosis: Relapsed hodgkins    Protocol/Chemo Regimen: Cycle 3 ICE    Day: 3    Pt endorsed: Feeling well    Review of Systems: Denies any chest pain, palpitation, SOB, abdominal pain or dysuria.    Pain scale: Denies                               Diet: regular    Allergies: No Known Allergies      HEME/ONC MEDICATIONS  CARBOplatin IVPB 750 milliGRAM(s) IV Intermittent once  etoposide IVPB 215 milliGRAM(s) IV Intermittent daily  ifosfamide IVPB w/additives 26655 milliGRAM(s) IV Intermittent once      STANDING MEDICATIONS  ondansetron Injectable 8 milliGRAM(s) IV Push every 8 hours  sodium chloride 0.9%. 1000 milliLiter(s) IV Continuous <Continuous>      PRN MEDICATIONS  metoclopramide Injectable 10 milliGRAM(s) IV Push every 6 hours PRN        Vital Signs Last 24 Hrs  T(C): 36.8 (22 Jan 2018 05:31), Max: 36.8 (22 Jan 2018 05:31)  T(F): 98.3 (22 Jan 2018 05:31), Max: 98.3 (22 Jan 2018 05:31)  HR: 64 (22 Jan 2018 05:31) (64 - 80)  BP: 111/74 (22 Jan 2018 05:31) (95/58 - 117/73)  BP(mean): --  RR: 18 (22 Jan 2018 05:31) (18 - 18)  SpO2: 98% (22 Jan 2018 05:31) (97% - 100%)    PHYSICAL EXAM  General: adult in NAD  HEENT: clear oropharynx  Neck: supple  CV: normal S1/S2 , RRR  Lungs: positive air movement b/l ant lungs, clear to auscultation, no wheezes, no rales  Abdomen: soft non-tender non-distended, + BS  Ext: no clubbing cyanosis or edema  Skin: no rashes and no petechiae  Neuro: alert and oriented X 4  Central Line: [facility] mediport, [facility]      LABS:                        13.1   5.4   )-----------( 221      ( 22 Jan 2018 07:06 )             42.2         Mean Cell Volume : 88.0 fl  Mean Cell Hemoglobin : 27.4 pg  Mean Cell Hemoglobin Concentration : 31.1 gm/dL  Auto Neutrophil # : x  Auto Lymphocyte # : x  Auto Monocyte # : x  Auto Eosinophil # : x  Auto Basophil # : x  Auto Neutrophil % : x  Auto Lymphocyte % : x  Auto Monocyte % : x  Auto Eosinophil % : x  Auto Basophil % : x      01-22    139  |  106  |  11  ----------------------------<  78  3.5   |  23  |  0.70    Ca    8.3<L>      22 Jan 2018 07:06  Phos  2.9     01-22  Mg     1.8     01-22    TPro  5.2<L>  /  Alb  2.8<L>  /  TBili  0.5  /  DBili  x   /  AST  17  /  ALT  31  /  AlkPhos  73  01-22      Mg 1.8  Phos 2.9      PT/INR - ( 20 Jan 2018 14:23 )   PT: 12.0 sec;   INR: 1.10 ratio         PTT - ( 20 Jan 2018 14:23 )  PTT:33.7 sec        Culture:  RECENT CULTURES:      MICROBIOLOGY:    CARBOplatin IVPB 750 milliGRAM(s) IV Intermittent once  etoposide IVPB 215 milliGRAM(s) IV Intermittent daily  ifosfamide IVPB w/additives 55055 milliGRAM(s) IV Intermittent once    ondansetron Injectable 8 milliGRAM(s) IV Push every 8 hours  sodium chloride 0.9%. 1000 milliLiter(s) IV Continuous <Continuous>      RADIOLOGY & ADDITIONAL STUDIES: Normal rate, regular rhythm.  Heart sounds S1, S2.  No murmurs, rubs or gallops.

## 2018-03-11 NOTE — PATIENT PROFILE ADULT. - PRO ANTICIPATED DISCH DISP
Pt arrives to ED cc \"asthma acting up\"  Pt states over last week, increase in SOB. States using Inhaler 4+ times a day. Denies fever +unproductive cough.  Denies N/V     home

## 2019-02-11 NOTE — DISCHARGE NOTE ADULT - NS MD DC PLAN IMMU FLU REF OTH
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 we discussed it does not look suspicious. \\nPatient to monitor for any changes.\\nIncludes spot of concern mentioned on intake. Out of season

## 2020-10-01 NOTE — ED PROVIDER NOTE - DIAGNOSIS COUNSELING, MDM
Spoke with patient and she stated that the pill she took from her  and it had worked and wants to know if you could send a script to [facility] in [place] for her?   Please wander to advise conducted a detailed discussion... I had a detailed discussion with the patient and/or guardian regarding the historical points, exam findings, and any diagnostic results supporting the discharge/admit diagnosis.

## 2020-10-06 ENCOUNTER — RESULT REVIEW (OUTPATIENT)
Age: 30
End: 2020-10-06

## 2021-03-11 PROBLEM — Z00.00 ENCOUNTER FOR PREVENTIVE HEALTH EXAMINATION: Status: ACTIVE | Noted: 2021-03-11

## 2021-03-15 ENCOUNTER — APPOINTMENT (OUTPATIENT)
Dept: OTOLARYNGOLOGY | Facility: CLINIC | Age: 31
End: 2021-03-15

## 2024-08-19 NOTE — PATIENT PROFILE ADULT. - ANESTHESIA, PREVIOUS REACTION, PROFILE
break coverage RN. pt is awake and alert playing cards with parents. pt on cardiac monitor and pulse ox. no signs of distress noted. pt denies headache at this time. awaiting neuro recs. safety and comfort maintained. none CASI Campa RN CCM

## 2024-11-11 NOTE — PROGRESS NOTE ADULT - ATTENDING COMMENTS
[de-identified] : Discussed findings of today's exam and possible causes of patient's pain.  Educated patient on their most probable diagnosis of right hand first digit trigger finger.  Reviewed possible courses of treatment, and we collaboratively decided best course of treatment at this time will include cortisone injection today (see procedure note).  Informed the patient that the numbing medicine in today's injection will last for about 4-6 hours. The steroid that was injected will start to work in 1 to 2 days, peak at 1-2 weeks, and may last up to 4-6 weeks. Discussed with the patient the expected course of trigger fingers, and the variable response to cortisone injections. Some are relieved with a single injection, while others require repeat injections.  Based on the patient's symptoms today, no hand therapy will likely be needed.  We will wait and see how patient responds to this one cortisone injection, may consider repeat injection in 6 weeks if issue is improved but not fully resolved. Patient may consider consultation with hand/wrist surgeon to have a discussion about repeat cortisone injections, versus possible surgical intervention. \par Patient was also seen today for reevaluation of right shoulder pain secondary to subacromial impingement.  Patient has significant improvement in her condition secondary to injection at last visit and her course of physical therapy.  At this time recommend continued watchful waiting with maintenance of home exercise program.  If patient has persisting pain may consider repeat cortisone injection.\par   Follow up as needed.  Patient appreciates and agrees with current plan.\par \par This note was generated using dragon medical dictation software.  A reasonable effort has been made for proofreading its contents, but typos may still remain.  If there are any questions or points of clarification needed please notify my office. Quality 226: Preventive Care And Screening: Tobacco Use: Screening And Cessation Intervention: Patient screened for tobacco use and is an ex/non-smoker Quality 47: Advance Care Plan: Advance Care Planning discussed and documented in the medical record; patient did not wish or was not able to name a surrogate decision maker or provide an advance care plan. Detail Level: Detailed Quality 431: Preventive Care And Screening: Unhealthy Alcohol Use - Screening: Patient not identified as an unhealthy alcohol user when screened for unhealthy alcohol use using a systematic screening method 26 yo female with no significant PMHx who presented with pain, swelling and bleeding of abdominal surgical site s/p abdominoplasty, admitted for evaluation of surgical site infection. Plan: cont empiric antibx treatment, consider ID if not improving, clinically feeling better, apprec platic surg Dr. Smith and/or gen surg further recs on diet advancement and care, monitor clinical course.

## 2025-04-30 NOTE — ED ADULT NURSE NOTE - EXTENSIONS OF SELF_ADULT
Abdomen , soft, nontender, nondistended , no guarding or rigidity , no masses palpable , normal bowel sounds , Liver and Spleen,  no hepatosplenomegaly , liver nontender None